# Patient Record
Sex: FEMALE | Race: WHITE | Employment: UNEMPLOYED | URBAN - NONMETROPOLITAN AREA
[De-identification: names, ages, dates, MRNs, and addresses within clinical notes are randomized per-mention and may not be internally consistent; named-entity substitution may affect disease eponyms.]

---

## 2018-12-17 ENCOUNTER — HOSPITAL ENCOUNTER (EMERGENCY)
Age: 23
Discharge: HOME | End: 2018-12-17
Attending: EMERGENCY MEDICINE
Payer: COMMERCIAL

## 2018-12-17 ENCOUNTER — APPOINTMENT (OUTPATIENT)
Dept: ULTRASOUND IMAGING | Age: 23
End: 2018-12-17
Payer: COMMERCIAL

## 2018-12-17 VITALS
TEMPERATURE: 98.3 F | SYSTOLIC BLOOD PRESSURE: 112 MMHG | RESPIRATION RATE: 16 BRPM | DIASTOLIC BLOOD PRESSURE: 67 MMHG | OXYGEN SATURATION: 100 % | HEART RATE: 106 BPM

## 2018-12-17 DIAGNOSIS — O21.0 HYPEREMESIS GRAVIDARUM: Primary | ICD-10-CM

## 2018-12-17 DIAGNOSIS — Z3A.01 7 WEEKS GESTATION OF PREGNANCY: ICD-10-CM

## 2018-12-17 LAB
ABO: NORMAL
ALBUMIN SERPL-MCNC: 4.2 G/DL (ref 3.5–5.1)
ALP BLD-CCNC: 65 U/L (ref 38–126)
ALT SERPL-CCNC: 12 U/L (ref 11–66)
ANION GAP SERPL CALCULATED.3IONS-SCNC: 15 MEQ/L (ref 8–16)
AST SERPL-CCNC: 15 U/L (ref 5–40)
BASOPHILS # BLD: 0.2 %
BASOPHILS ABSOLUTE: 0 THOU/MM3 (ref 0–0.1)
BILIRUB SERPL-MCNC: 0.6 MG/DL (ref 0.3–1.2)
BILIRUBIN DIRECT: < 0.2 MG/DL (ref 0–0.3)
BUN BLDV-MCNC: 17 MG/DL (ref 7–22)
CALCIUM SERPL-MCNC: 9.7 MG/DL (ref 8.5–10.5)
CHLORIDE BLD-SCNC: 100 MEQ/L (ref 98–111)
CO2: 23 MEQ/L (ref 23–33)
CREAT SERPL-MCNC: 0.7 MG/DL (ref 0.4–1.2)
EOSINOPHIL # BLD: 0.6 %
EOSINOPHILS ABSOLUTE: 0.1 THOU/MM3 (ref 0–0.4)
ERYTHROCYTE [DISTWIDTH] IN BLOOD BY AUTOMATED COUNT: 11.5 % (ref 11.5–14.5)
ERYTHROCYTE [DISTWIDTH] IN BLOOD BY AUTOMATED COUNT: 37.4 FL (ref 35–45)
GFR SERPL CREATININE-BSD FRML MDRD: > 90 ML/MIN/1.73M2
GLUCOSE BLD-MCNC: 84 MG/DL (ref 70–108)
HCG,BETA SUBUNIT,QUAL,SERUM: ABNORMAL MIU/ML (ref 0–5)
HCT VFR BLD CALC: 41.4 % (ref 37–47)
HEMOGLOBIN: 14.4 GM/DL (ref 12–16)
IMMATURE GRANS (ABS): 0.03 THOU/MM3 (ref 0–0.07)
IMMATURE GRANULOCYTES: 0.4 %
LIPASE: 22.3 U/L (ref 5.6–51.3)
LYMPHOCYTES # BLD: 21.1 %
LYMPHOCYTES ABSOLUTE: 1.8 THOU/MM3 (ref 1–4.8)
MCH RBC QN AUTO: 31.4 PG (ref 26–33)
MCHC RBC AUTO-ENTMCNC: 34.8 GM/DL (ref 32.2–35.5)
MCV RBC AUTO: 90.2 FL (ref 81–99)
MONOCYTES # BLD: 8.2 %
MONOCYTES ABSOLUTE: 0.7 THOU/MM3 (ref 0.4–1.3)
NUCLEATED RED BLOOD CELLS: 0 /100 WBC
OSMOLALITY CALCULATION: 276.4 MOSMOL/KG (ref 275–300)
PLATELET # BLD: 270 THOU/MM3 (ref 130–400)
PMV BLD AUTO: 9.8 FL (ref 9.4–12.4)
POTASSIUM SERPL-SCNC: 3.9 MEQ/L (ref 3.5–5.2)
RBC # BLD: 4.59 MILL/MM3 (ref 4.2–5.4)
RH FACTOR: NORMAL
SEG NEUTROPHILS: 69.5 %
SEGMENTED NEUTROPHILS ABSOLUTE COUNT: 5.9 THOU/MM3 (ref 1.8–7.7)
SODIUM BLD-SCNC: 138 MEQ/L (ref 135–145)
TOTAL PROTEIN: 7.7 G/DL (ref 6.1–8)
WBC # BLD: 8.5 THOU/MM3 (ref 4.8–10.8)

## 2018-12-17 PROCEDURE — 76801 OB US < 14 WKS SINGLE FETUS: CPT

## 2018-12-17 PROCEDURE — 76817 TRANSVAGINAL US OBSTETRIC: CPT

## 2018-12-17 PROCEDURE — 86901 BLOOD TYPING SEROLOGIC RH(D): CPT

## 2018-12-17 PROCEDURE — 85025 COMPLETE CBC W/AUTO DIFF WBC: CPT

## 2018-12-17 PROCEDURE — 84702 CHORIONIC GONADOTROPIN TEST: CPT

## 2018-12-17 PROCEDURE — 80053 COMPREHEN METABOLIC PANEL: CPT

## 2018-12-17 PROCEDURE — 82248 BILIRUBIN DIRECT: CPT

## 2018-12-17 PROCEDURE — 6370000000 HC RX 637 (ALT 250 FOR IP)

## 2018-12-17 PROCEDURE — 83690 ASSAY OF LIPASE: CPT

## 2018-12-17 PROCEDURE — 99284 EMERGENCY DEPT VISIT MOD MDM: CPT

## 2018-12-17 PROCEDURE — 86900 BLOOD TYPING SEROLOGIC ABO: CPT

## 2018-12-17 PROCEDURE — 36415 COLL VENOUS BLD VENIPUNCTURE: CPT

## 2018-12-17 RX ORDER — ACETAMINOPHEN 325 MG/1
TABLET ORAL
Status: COMPLETED
Start: 2018-12-17 | End: 2018-12-17

## 2018-12-17 RX ORDER — METOCLOPRAMIDE 10 MG/1
TABLET ORAL
Qty: 10 TABLET | Refills: 0 | Status: SHIPPED | OUTPATIENT
Start: 2018-12-17 | End: 2018-12-30

## 2018-12-17 RX ORDER — 0.9 % SODIUM CHLORIDE 0.9 %
500 INTRAVENOUS SOLUTION INTRAVENOUS ONCE
Status: DISCONTINUED | OUTPATIENT
Start: 2018-12-17 | End: 2018-12-17 | Stop reason: HOSPADM

## 2018-12-17 RX ORDER — ACETAMINOPHEN 325 MG/1
650 TABLET ORAL ONCE
Status: COMPLETED | OUTPATIENT
Start: 2018-12-17 | End: 2018-12-17

## 2018-12-17 RX ORDER — SODIUM CHLORIDE 9 MG/ML
INJECTION, SOLUTION INTRAVENOUS CONTINUOUS
Status: DISCONTINUED | OUTPATIENT
Start: 2018-12-17 | End: 2018-12-17 | Stop reason: HOSPADM

## 2018-12-17 RX ADMIN — ACETAMINOPHEN 650 MG: 325 TABLET ORAL at 15:16

## 2018-12-17 ASSESSMENT — ENCOUNTER SYMPTOMS
VOMITING: 1
PHOTOPHOBIA: 0
BLOOD IN STOOL: 0
RHINORRHEA: 0
BACK PAIN: 0
DIARRHEA: 0
NAUSEA: 1
SHORTNESS OF BREATH: 0
SORE THROAT: 0
ABDOMINAL PAIN: 1
COUGH: 0
CHEST TIGHTNESS: 0
CONSTIPATION: 0
WHEEZING: 0

## 2018-12-17 ASSESSMENT — PAIN DESCRIPTION - PAIN TYPE: TYPE: ACUTE PAIN

## 2018-12-17 ASSESSMENT — PAIN SCALES - GENERAL: PAINLEVEL_OUTOF10: 4

## 2018-12-17 ASSESSMENT — PAIN DESCRIPTION - DESCRIPTORS: DESCRIPTORS: CRAMPING

## 2018-12-17 NOTE — ED TRIAGE NOTES
Patient presents with morning sickness. States she took her pregnancy test last week and it was positive. States she started feeling nauseated and sick on Tuesday and her prenatals made her more sick.  States the last time she threw up was last night

## 2018-12-17 NOTE — ED PROVIDER NOTES
guarding. Musculoskeletal: Normal range of motion. Neurological: She is alert and oriented to person, place, and time. No sensory deficit. She exhibits normal muscle tone. Skin: Skin is warm and dry. No rash noted. Psychiatric: She has a normal mood and affect. Her speech is normal and behavior is normal.   Nursing note and vitals reviewed. MEDICAL DECISION MAKING    DIFFERENTIAL DIAGNOSIS:  Hyperemesis gravidarum, dehydration      DIAGNOSTIC RESULTS    RADIOLOGY:  I have reviewed radiologic plain film image(s). The plain films will be read or overread by the radiologist.All other non-plain film images(s) such as CT, Ultrasound and MRI have been read by the radiologist.  US OB LESS THAN 14 WEEKS SINGLE OR FIRST GESTATION   Final Result      1. Single, living, intrauterine gestation with estimated gestational age by today's ultrasound of 7 weeks, 3 days. Estimated due date of 8/2/2019.   2. Small perigestational subchorionic hemorrhage. **This report has been created using voice recognition software. It may contain minor errors which are inherent in voice recognition technology. **      Final report electronically signed by Dr. Guillermo Rincon on 12/17/2018 3:09 PM      US OB TRANSVAGINAL   Final Result      1. Single, living, intrauterine gestation with estimated gestational age by today's ultrasound of 7 weeks, 3 days. Estimated due date of 8/2/2019.   2. Small perigestational subchorionic hemorrhage. **This report has been created using voice recognition software. It may contain minor errors which are inherent in voice recognition technology. **      Final report electronically signed by Dr. Guillermo Rincon on 12/17/2018 3:09 PM          LABS:   Labs Reviewed   HCG, QUANTITATIVE, PREGNANCY - Abnormal; Notable for the following:        Result Value    hCG,Beta Subunit,Qual,Serum 217401.0 (*)     All other components within normal limits   CBC WITH AUTO DIFFERENTIAL   BASIC METABOLIC PANEL González MEYER Signed by: Janis Trejo, 12/23/18 11:10 PM    Provider:  I personally performed the services described in the documentation, reviewed and edited the documentation which was dictated to the scribe in my presence, and it accurately records my words andactions.      12/23/18 11:10 PM      No att. providers found      Emergency room physician       Ivania Muñiz MD  12/23/18 [id number]

## 2018-12-30 ENCOUNTER — HOSPITAL ENCOUNTER (EMERGENCY)
Age: 23
Discharge: HOME | End: 2018-12-30
Attending: EMERGENCY MEDICINE
Payer: COMMERCIAL

## 2018-12-30 ENCOUNTER — NURSE TRIAGE (OUTPATIENT)
Dept: ADMINISTRATIVE | Age: 23
End: 2018-12-30

## 2018-12-30 VITALS
HEIGHT: 64 IN | TEMPERATURE: 98.7 F | WEIGHT: 160 LBS | DIASTOLIC BLOOD PRESSURE: 68 MMHG | RESPIRATION RATE: 15 BRPM | HEART RATE: 87 BPM | SYSTOLIC BLOOD PRESSURE: 120 MMHG | OXYGEN SATURATION: 97 % | BODY MASS INDEX: 27.31 KG/M2

## 2018-12-30 DIAGNOSIS — R51.9 NONINTRACTABLE EPISODIC HEADACHE, UNSPECIFIED HEADACHE TYPE: ICD-10-CM

## 2018-12-30 DIAGNOSIS — O21.0 HYPEREMESIS GRAVIDARUM: ICD-10-CM

## 2018-12-30 DIAGNOSIS — Z3A.09 9 WEEKS GESTATION OF PREGNANCY: Primary | ICD-10-CM

## 2018-12-30 LAB
ANION GAP SERPL CALCULATED.3IONS-SCNC: 13 MEQ/L (ref 8–16)
BASOPHILS # BLD: 0.1 %
BASOPHILS ABSOLUTE: 0 THOU/MM3 (ref 0–0.1)
BUN BLDV-MCNC: 14 MG/DL (ref 7–22)
CALCIUM SERPL-MCNC: 9.2 MG/DL (ref 8.5–10.5)
CHLORIDE BLD-SCNC: 97 MEQ/L (ref 98–111)
CO2: 25 MEQ/L (ref 23–33)
CREAT SERPL-MCNC: 0.4 MG/DL (ref 0.4–1.2)
EOSINOPHIL # BLD: 0.8 %
EOSINOPHILS ABSOLUTE: 0.1 THOU/MM3 (ref 0–0.4)
ERYTHROCYTE [DISTWIDTH] IN BLOOD BY AUTOMATED COUNT: 11.4 % (ref 11.5–14.5)
ERYTHROCYTE [DISTWIDTH] IN BLOOD BY AUTOMATED COUNT: 36.8 FL (ref 35–45)
GFR SERPL CREATININE-BSD FRML MDRD: > 90 ML/MIN/1.73M2
GLUCOSE BLD-MCNC: 90 MG/DL (ref 70–108)
HCT VFR BLD CALC: 38 % (ref 37–47)
HEMOGLOBIN: 13.5 GM/DL (ref 12–16)
IMMATURE GRANS (ABS): 0.03 THOU/MM3 (ref 0–0.07)
IMMATURE GRANULOCYTES: 0.3 %
LYMPHOCYTES # BLD: 22 %
LYMPHOCYTES ABSOLUTE: 2 THOU/MM3 (ref 1–4.8)
MCH RBC QN AUTO: 31.3 PG (ref 26–33)
MCHC RBC AUTO-ENTMCNC: 35.5 GM/DL (ref 32.2–35.5)
MCV RBC AUTO: 88 FL (ref 81–99)
MONOCYTES # BLD: 8.3 %
MONOCYTES ABSOLUTE: 0.7 THOU/MM3 (ref 0.4–1.3)
NUCLEATED RED BLOOD CELLS: 0 /100 WBC
OSMOLALITY CALCULATION: 270.1 MOSMOL/KG (ref 275–300)
PLATELET # BLD: 267 THOU/MM3 (ref 130–400)
PMV BLD AUTO: 9.6 FL (ref 9.4–12.4)
POTASSIUM SERPL-SCNC: 3.6 MEQ/L (ref 3.5–5.2)
PREGNANCY, SERUM: POSITIVE
RBC # BLD: 4.32 MILL/MM3 (ref 4.2–5.4)
SEG NEUTROPHILS: 68.5 %
SEGMENTED NEUTROPHILS ABSOLUTE COUNT: 6.2 THOU/MM3 (ref 1.8–7.7)
SODIUM BLD-SCNC: 135 MEQ/L (ref 135–145)
WBC # BLD: 9 THOU/MM3 (ref 4.8–10.8)

## 2018-12-30 PROCEDURE — 84703 CHORIONIC GONADOTROPIN ASSAY: CPT

## 2018-12-30 PROCEDURE — 80048 BASIC METABOLIC PNL TOTAL CA: CPT

## 2018-12-30 PROCEDURE — 99284 EMERGENCY DEPT VISIT MOD MDM: CPT

## 2018-12-30 PROCEDURE — 6360000002 HC RX W HCPCS: Performed by: EMERGENCY MEDICINE

## 2018-12-30 PROCEDURE — 85025 COMPLETE CBC W/AUTO DIFF WBC: CPT

## 2018-12-30 PROCEDURE — 6360000002 HC RX W HCPCS

## 2018-12-30 PROCEDURE — 2580000003 HC RX 258: Performed by: EMERGENCY MEDICINE

## 2018-12-30 PROCEDURE — 96361 HYDRATE IV INFUSION ADD-ON: CPT

## 2018-12-30 PROCEDURE — 96374 THER/PROPH/DIAG INJ IV PUSH: CPT

## 2018-12-30 PROCEDURE — 36415 COLL VENOUS BLD VENIPUNCTURE: CPT

## 2018-12-30 RX ORDER — ONDANSETRON 4 MG/1
4 TABLET, ORALLY DISINTEGRATING ORAL ONCE
Status: COMPLETED | OUTPATIENT
Start: 2018-12-30 | End: 2018-12-30

## 2018-12-30 RX ORDER — METOCLOPRAMIDE HYDROCHLORIDE 5 MG/ML
10 INJECTION INTRAMUSCULAR; INTRAVENOUS ONCE
Status: COMPLETED | OUTPATIENT
Start: 2018-12-30 | End: 2018-12-30

## 2018-12-30 RX ORDER — KETOROLAC TROMETHAMINE 30 MG/ML
30 INJECTION, SOLUTION INTRAMUSCULAR; INTRAVENOUS ONCE
Status: DISCONTINUED | OUTPATIENT
Start: 2018-12-30 | End: 2018-12-30

## 2018-12-30 RX ORDER — 0.9 % SODIUM CHLORIDE 0.9 %
500 INTRAVENOUS SOLUTION INTRAVENOUS ONCE
Status: COMPLETED | OUTPATIENT
Start: 2018-12-30 | End: 2018-12-30

## 2018-12-30 RX ORDER — ONDANSETRON 4 MG/1
TABLET, ORALLY DISINTEGRATING ORAL
Status: COMPLETED
Start: 2018-12-30 | End: 2018-12-30

## 2018-12-30 RX ORDER — SODIUM CHLORIDE 9 MG/ML
INJECTION, SOLUTION INTRAVENOUS CONTINUOUS
Status: DISCONTINUED | OUTPATIENT
Start: 2018-12-30 | End: 2018-12-31 | Stop reason: HOSPADM

## 2018-12-30 RX ORDER — ONDANSETRON 4 MG/1
4 TABLET, FILM COATED ORAL EVERY 8 HOURS PRN
COMMUNITY
End: 2021-09-08 | Stop reason: ALTCHOICE

## 2018-12-30 RX ORDER — PROMETHAZINE HYDROCHLORIDE 25 MG/1
25 TABLET ORAL EVERY 6 HOURS PRN
Qty: 20 TABLET | Refills: 0 | Status: SHIPPED | OUTPATIENT
Start: 2018-12-30 | End: 2019-12-30

## 2018-12-30 RX ADMIN — ONDANSETRON 4 MG: 4 TABLET, ORALLY DISINTEGRATING ORAL at 23:05

## 2018-12-30 RX ADMIN — METOCLOPRAMIDE 10 MG: 5 INJECTION, SOLUTION INTRAMUSCULAR; INTRAVENOUS at 22:11

## 2018-12-30 RX ADMIN — SODIUM CHLORIDE 500 ML: 9 INJECTION, SOLUTION INTRAVENOUS at 22:11

## 2018-12-30 ASSESSMENT — ENCOUNTER SYMPTOMS
WHEEZING: 0
EYE PAIN: 0
SORE THROAT: 0
BACK PAIN: 0
RHINORRHEA: 0
DIARRHEA: 0
NAUSEA: 1
ABDOMINAL PAIN: 0
SHORTNESS OF BREATH: 0
VOMITING: 1
EYE DISCHARGE: 0
COUGH: 0

## 2018-12-30 ASSESSMENT — PAIN DESCRIPTION - DESCRIPTORS: DESCRIPTORS: ACHING

## 2018-12-30 ASSESSMENT — PAIN SCALES - GENERAL: PAINLEVEL_OUTOF10: 4

## 2018-12-30 ASSESSMENT — PAIN DESCRIPTION - PAIN TYPE: TYPE: ACUTE PAIN

## 2018-12-30 ASSESSMENT — PAIN DESCRIPTION - LOCATION: LOCATION: HEAD

## 2019-08-09 ENCOUNTER — ANESTHESIA EVENT (OUTPATIENT)
Dept: LABOR AND DELIVERY | Age: 24
DRG: 560 | End: 2019-08-09
Payer: MEDICAID

## 2019-08-09 ENCOUNTER — HOSPITAL ENCOUNTER (INPATIENT)
Age: 24
LOS: 2 days | Discharge: HOME | DRG: 560 | End: 2019-08-11
Attending: OBSTETRICS & GYNECOLOGY | Admitting: OBSTETRICS & GYNECOLOGY
Payer: MEDICAID

## 2019-08-09 ENCOUNTER — ANESTHESIA (OUTPATIENT)
Dept: LABOR AND DELIVERY | Age: 24
DRG: 560 | End: 2019-08-09
Payer: MEDICAID

## 2019-08-09 PROBLEM — O47.9 FALSE LABOR: Status: ACTIVE | Noted: 2019-08-09

## 2019-08-09 LAB
ABO: NORMAL
AMPHETAMINE+METHAMPHETAMINE URINE SCREEN: NEGATIVE
ANTIBODY SCREEN: NORMAL
BARBITURATE QUANTITATIVE URINE: NEGATIVE
BENZODIAZEPINE QUANTITATIVE URINE: NEGATIVE
CANNABINOID QUANTITATIVE URINE: NEGATIVE
COCAINE METABOLITE QUANTITATIVE URINE: NEGATIVE
ERYTHROCYTE [DISTWIDTH] IN BLOOD BY AUTOMATED COUNT: 14 % (ref 11.5–14.5)
ERYTHROCYTE [DISTWIDTH] IN BLOOD BY AUTOMATED COUNT: 46.9 FL (ref 35–45)
HCT VFR BLD CALC: 36.4 % (ref 37–47)
HEMOGLOBIN: 12 GM/DL (ref 12–16)
MCH RBC QN AUTO: 30.1 PG (ref 26–33)
MCHC RBC AUTO-ENTMCNC: 33 GM/DL (ref 32.2–35.5)
MCV RBC AUTO: 91.2 FL (ref 81–99)
OPIATES, URINE: NEGATIVE
OXYCODONE: NEGATIVE
PHENCYCLIDINE QUANTITATIVE URINE: NEGATIVE
PLATELET # BLD: 229 THOU/MM3 (ref 130–400)
PMV BLD AUTO: 10.1 FL (ref 9.4–12.4)
RBC # BLD: 3.99 MILL/MM3 (ref 4.2–5.4)
RH FACTOR: NORMAL
RPR: NONREACTIVE
WBC # BLD: 12.9 THOU/MM3 (ref 4.8–10.8)

## 2019-08-09 PROCEDURE — 86850 RBC ANTIBODY SCREEN: CPT

## 2019-08-09 PROCEDURE — 80305 DRUG TEST PRSMV DIR OPT OBS: CPT

## 2019-08-09 PROCEDURE — 85027 COMPLETE CBC AUTOMATED: CPT

## 2019-08-09 PROCEDURE — 36415 COLL VENOUS BLD VENIPUNCTURE: CPT

## 2019-08-09 PROCEDURE — 86901 BLOOD TYPING SEROLOGIC RH(D): CPT

## 2019-08-09 PROCEDURE — 1200000000 HC SEMI PRIVATE

## 2019-08-09 PROCEDURE — 3700000025 EPIDURAL BLOCK: Performed by: ANESTHESIOLOGY

## 2019-08-09 PROCEDURE — 2580000003 HC RX 258: Performed by: OBSTETRICS & GYNECOLOGY

## 2019-08-09 PROCEDURE — 86592 SYPHILIS TEST NON-TREP QUAL: CPT

## 2019-08-09 PROCEDURE — 6370000000 HC RX 637 (ALT 250 FOR IP): Performed by: OBSTETRICS & GYNECOLOGY

## 2019-08-09 PROCEDURE — 0KQM0ZZ REPAIR PERINEUM MUSCLE, OPEN APPROACH: ICD-10-PCS | Performed by: OBSTETRICS & GYNECOLOGY

## 2019-08-09 PROCEDURE — 2709999900 HC NON-CHARGEABLE SUPPLY

## 2019-08-09 PROCEDURE — 10907ZC DRAINAGE OF AMNIOTIC FLUID, THERAPEUTIC FROM PRODUCTS OF CONCEPTION, VIA NATURAL OR ARTIFICIAL OPENING: ICD-10-PCS | Performed by: OBSTETRICS & GYNECOLOGY

## 2019-08-09 PROCEDURE — 7200000001 HC VAGINAL DELIVERY

## 2019-08-09 PROCEDURE — 2500000003 HC RX 250 WO HCPCS: Performed by: ANESTHESIOLOGY

## 2019-08-09 PROCEDURE — 86900 BLOOD TYPING SEROLOGIC ABO: CPT

## 2019-08-09 PROCEDURE — 6360000002 HC RX W HCPCS

## 2019-08-09 PROCEDURE — 6360000002 HC RX W HCPCS: Performed by: OBSTETRICS & GYNECOLOGY

## 2019-08-09 RX ORDER — METHYLERGONOVINE MALEATE 0.2 MG/ML
200 INJECTION INTRAVENOUS SEE ADMIN INSTRUCTIONS
Status: DISCONTINUED | OUTPATIENT
Start: 2019-08-09 | End: 2019-08-11 | Stop reason: HOSPADM

## 2019-08-09 RX ORDER — SODIUM CHLORIDE 0.9 % (FLUSH) 0.9 %
10 SYRINGE (ML) INJECTION EVERY 12 HOURS SCHEDULED
Status: DISCONTINUED | OUTPATIENT
Start: 2019-08-09 | End: 2019-08-11 | Stop reason: HOSPADM

## 2019-08-09 RX ORDER — FERROUS SULFATE 325(65) MG
325 TABLET ORAL
Status: DISCONTINUED | OUTPATIENT
Start: 2019-08-10 | End: 2019-08-11 | Stop reason: HOSPADM

## 2019-08-09 RX ORDER — IBUPROFEN 800 MG/1
800 TABLET ORAL EVERY 8 HOURS PRN
Status: DISCONTINUED | OUTPATIENT
Start: 2019-08-09 | End: 2019-08-09 | Stop reason: HOSPADM

## 2019-08-09 RX ORDER — ACETAMINOPHEN 325 MG/1
650 TABLET ORAL EVERY 4 HOURS PRN
Status: DISCONTINUED | OUTPATIENT
Start: 2019-08-09 | End: 2019-08-11 | Stop reason: HOSPADM

## 2019-08-09 RX ORDER — SODIUM CHLORIDE 0.9 % (FLUSH) 0.9 %
10 SYRINGE (ML) INJECTION PRN
Status: DISCONTINUED | OUTPATIENT
Start: 2019-08-09 | End: 2019-08-11 | Stop reason: HOSPADM

## 2019-08-09 RX ORDER — MISOPROSTOL 200 UG/1
1000 TABLET ORAL PRN
Status: DISCONTINUED | OUTPATIENT
Start: 2019-08-09 | End: 2019-08-11 | Stop reason: HOSPADM

## 2019-08-09 RX ORDER — LANOLIN 100 %
OINTMENT (GRAM) TOPICAL PRN
Status: DISCONTINUED | OUTPATIENT
Start: 2019-08-09 | End: 2019-08-11 | Stop reason: HOSPADM

## 2019-08-09 RX ORDER — LIDOCAINE HYDROCHLORIDE 10 MG/ML
30 INJECTION, SOLUTION EPIDURAL; INFILTRATION; INTRACAUDAL; PERINEURAL PRN
Status: DISCONTINUED | OUTPATIENT
Start: 2019-08-09 | End: 2019-08-09 | Stop reason: HOSPADM

## 2019-08-09 RX ORDER — ONDANSETRON 4 MG/1
8 TABLET, FILM COATED ORAL EVERY 8 HOURS PRN
Status: DISCONTINUED | OUTPATIENT
Start: 2019-08-09 | End: 2019-08-11 | Stop reason: HOSPADM

## 2019-08-09 RX ORDER — METHYLERGONOVINE MALEATE 0.2 MG/ML
200 INJECTION INTRAVENOUS PRN
Status: DISCONTINUED | OUTPATIENT
Start: 2019-08-09 | End: 2019-08-09 | Stop reason: HOSPADM

## 2019-08-09 RX ORDER — SODIUM PHOSPHATE, DIBASIC AND SODIUM PHOSPHATE, MONOBASIC 7; 19 G/133ML; G/133ML
1 ENEMA RECTAL
Status: DISCONTINUED | OUTPATIENT
Start: 2019-08-09 | End: 2019-08-09

## 2019-08-09 RX ORDER — DOCUSATE SODIUM 100 MG/1
100 CAPSULE, LIQUID FILLED ORAL 2 TIMES DAILY PRN
Status: DISCONTINUED | OUTPATIENT
Start: 2019-08-09 | End: 2019-08-11 | Stop reason: HOSPADM

## 2019-08-09 RX ORDER — CARBOPROST TROMETHAMINE 250 UG/ML
250 INJECTION, SOLUTION INTRAMUSCULAR PRN
Status: DISCONTINUED | OUTPATIENT
Start: 2019-08-09 | End: 2019-08-09 | Stop reason: HOSPADM

## 2019-08-09 RX ORDER — CARBOPROST TROMETHAMINE 250 UG/ML
250 INJECTION, SOLUTION INTRAMUSCULAR
Status: DISPENSED | OUTPATIENT
Start: 2019-08-09 | End: 2019-08-09

## 2019-08-09 RX ORDER — BUTORPHANOL TARTRATE 1 MG/ML
1 INJECTION, SOLUTION INTRAMUSCULAR; INTRAVENOUS
Status: DISCONTINUED | OUTPATIENT
Start: 2019-08-09 | End: 2019-08-09 | Stop reason: HOSPADM

## 2019-08-09 RX ORDER — MISOPROSTOL 200 UG/1
1000 TABLET ORAL PRN
Status: DISCONTINUED | OUTPATIENT
Start: 2019-08-09 | End: 2019-08-09 | Stop reason: HOSPADM

## 2019-08-09 RX ORDER — SODIUM CHLORIDE 0.9 % (FLUSH) 0.9 %
10 SYRINGE (ML) INJECTION PRN
Status: DISCONTINUED | OUTPATIENT
Start: 2019-08-09 | End: 2019-08-09 | Stop reason: HOSPADM

## 2019-08-09 RX ORDER — NALBUPHINE HCL 10 MG/ML
5 AMPUL (ML) INJECTION EVERY 4 HOURS PRN
Status: DISCONTINUED | OUTPATIENT
Start: 2019-08-09 | End: 2019-08-09 | Stop reason: HOSPADM

## 2019-08-09 RX ORDER — ONDANSETRON 2 MG/ML
8 INJECTION INTRAMUSCULAR; INTRAVENOUS EVERY 6 HOURS PRN
Status: DISCONTINUED | OUTPATIENT
Start: 2019-08-09 | End: 2019-08-09 | Stop reason: HOSPADM

## 2019-08-09 RX ORDER — DIPHENHYDRAMINE HYDROCHLORIDE 50 MG/ML
25 INJECTION INTRAMUSCULAR; INTRAVENOUS EVERY 4 HOURS PRN
Status: DISCONTINUED | OUTPATIENT
Start: 2019-08-09 | End: 2019-08-09 | Stop reason: HOSPADM

## 2019-08-09 RX ORDER — SEVOFLURANE 250 ML/250ML
1 LIQUID RESPIRATORY (INHALATION) CONTINUOUS PRN
Status: DISCONTINUED | OUTPATIENT
Start: 2019-08-09 | End: 2019-08-09 | Stop reason: HOSPADM

## 2019-08-09 RX ORDER — NALOXONE HYDROCHLORIDE 0.4 MG/ML
0.4 INJECTION, SOLUTION INTRAMUSCULAR; INTRAVENOUS; SUBCUTANEOUS PRN
Status: DISCONTINUED | OUTPATIENT
Start: 2019-08-09 | End: 2019-08-09 | Stop reason: HOSPADM

## 2019-08-09 RX ORDER — HYDROCODONE BITARTRATE AND ACETAMINOPHEN 5; 325 MG/1; MG/1
1 TABLET ORAL EVERY 4 HOURS PRN
Status: DISCONTINUED | OUTPATIENT
Start: 2019-08-09 | End: 2019-08-11 | Stop reason: HOSPADM

## 2019-08-09 RX ORDER — TERBUTALINE SULFATE 1 MG/ML
0.25 INJECTION, SOLUTION SUBCUTANEOUS ONCE
Status: DISCONTINUED | OUTPATIENT
Start: 2019-08-09 | End: 2019-08-09 | Stop reason: HOSPADM

## 2019-08-09 RX ORDER — ACETAMINOPHEN 325 MG/1
650 TABLET ORAL EVERY 4 HOURS PRN
Status: DISCONTINUED | OUTPATIENT
Start: 2019-08-09 | End: 2019-08-09 | Stop reason: HOSPADM

## 2019-08-09 RX ORDER — SODIUM CHLORIDE 0.9 % (FLUSH) 0.9 %
10 SYRINGE (ML) INJECTION EVERY 12 HOURS SCHEDULED
Status: DISCONTINUED | OUTPATIENT
Start: 2019-08-09 | End: 2019-08-09

## 2019-08-09 RX ORDER — SODIUM CHLORIDE, SODIUM LACTATE, POTASSIUM CHLORIDE, CALCIUM CHLORIDE 600; 310; 30; 20 MG/100ML; MG/100ML; MG/100ML; MG/100ML
INJECTION, SOLUTION INTRAVENOUS CONTINUOUS
Status: DISCONTINUED | OUTPATIENT
Start: 2019-08-09 | End: 2019-08-11 | Stop reason: HOSPADM

## 2019-08-09 RX ORDER — SODIUM CHLORIDE, SODIUM LACTATE, POTASSIUM CHLORIDE, CALCIUM CHLORIDE 600; 310; 30; 20 MG/100ML; MG/100ML; MG/100ML; MG/100ML
INJECTION, SOLUTION INTRAVENOUS CONTINUOUS
Status: DISCONTINUED | OUTPATIENT
Start: 2019-08-09 | End: 2019-08-09

## 2019-08-09 RX ORDER — ONDANSETRON 2 MG/ML
4 INJECTION INTRAMUSCULAR; INTRAVENOUS EVERY 6 HOURS PRN
Status: DISCONTINUED | OUTPATIENT
Start: 2019-08-09 | End: 2019-08-09 | Stop reason: HOSPADM

## 2019-08-09 RX ORDER — IBUPROFEN 800 MG/1
800 TABLET ORAL EVERY 8 HOURS SCHEDULED
Status: DISCONTINUED | OUTPATIENT
Start: 2019-08-09 | End: 2019-08-11 | Stop reason: HOSPADM

## 2019-08-09 RX ADMIN — SODIUM CHLORIDE, POTASSIUM CHLORIDE, SODIUM LACTATE AND CALCIUM CHLORIDE: 600; 310; 30; 20 INJECTION, SOLUTION INTRAVENOUS at 11:08

## 2019-08-09 RX ADMIN — SODIUM CHLORIDE, POTASSIUM CHLORIDE, SODIUM LACTATE AND CALCIUM CHLORIDE: 600; 310; 30; 20 INJECTION, SOLUTION INTRAVENOUS at 14:35

## 2019-08-09 RX ADMIN — BUTORPHANOL TARTRATE 1 MG: 1 INJECTION, SOLUTION INTRAMUSCULAR; INTRAVENOUS at 09:45

## 2019-08-09 RX ADMIN — ONDANSETRON 8 MG: 2 INJECTION INTRAMUSCULAR; INTRAVENOUS at 14:33

## 2019-08-09 RX ADMIN — SODIUM CHLORIDE, POTASSIUM CHLORIDE, SODIUM LACTATE AND CALCIUM CHLORIDE: 600; 310; 30; 20 INJECTION, SOLUTION INTRAVENOUS at 07:55

## 2019-08-09 RX ADMIN — Medication 1 MILLI-UNITS/MIN: at 08:50

## 2019-08-09 RX ADMIN — ACETAMINOPHEN 650 MG: 325 TABLET ORAL at 14:35

## 2019-08-09 RX ADMIN — Medication 15 ML/HR: at 11:19

## 2019-08-09 RX ADMIN — IBUPROFEN 800 MG: 800 TABLET, FILM COATED ORAL at 19:56

## 2019-08-09 ASSESSMENT — PAIN SCALES - GENERAL
PAINLEVEL_OUTOF10: 2
PAINLEVEL_OUTOF10: 3
PAINLEVEL_OUTOF10: 6
PAINLEVEL_OUTOF10: 0

## 2019-08-09 NOTE — ANESTHESIA PRE PROCEDURE
Department of Anesthesiology  Preprocedure Note       Name:  Rhonda Cintron   Age:  26 y.o.  :  1995                                          MRN:  [id number]         Date:  2019      Surgeon: * No surgeons listed *    Procedure: Labor Analgesia    Medications prior to admission:   Prior to Admission medications    Medication Sig Start Date End Date Taking?  Authorizing Provider   ondansetron (ZOFRAN) 4 MG tablet Take 4 mg by mouth every 8 hours as needed for Nausea or Vomiting    Historical Provider, MD   promethazine (PHENERGAN) 25 MG tablet Take 1 tablet by mouth every 6 hours as needed for Nausea (vomiting) 18  Noa Dos Santos MD       Current medications:    Current Facility-Administered Medications   Medication Dose Route Frequency Provider Last Rate Last Dose    lactated ringers infusion   Intravenous Continuous Sakshi Castellanos  mL/hr at 19 1108      sodium chloride flush 0.9 % injection 10 mL  10 mL Intravenous 2 times per day Sakshi Castellanos MD        sodium chloride flush 0.9 % injection 10 mL  10 mL Intravenous PRN Sakshi Castellanos MD        lidocaine PF 1 % injection 30 mL  30 mL Other PRN Sakshi Castellanos MD        butorphanol (STADOL) injection 1 mg  1 mg Intravenous Q2H PRN Sakshi Castellanos MD   1 mg at 19 0945    nitrous oxide 50% inhalation 1 each  1 each Inhalation Continuous PRN Sakshi Castellanos MD        acetaminophen (TYLENOL) tablet 650 mg  650 mg Oral Q4H PRN Sakshi Castellanos MD        ibuprofen (ADVIL;MOTRIN) tablet 800 mg  800 mg Oral Q8H PRN Sakshi Castellanos MD        oxytocin (PITOCIN) 30 units in 500 mL infusion  1 lee-units/min Intravenous Continuous Sakshi Castellanos MD 4 mL/hr at 19 0950 4 lee-units/min at 19 0950    diphenhydrAMINE (BENADRYL) injection 25 mg  25 mg Intravenous Q4H PRN Sakshi Castellanos MD        [facility]ron [facility] injection 8 mg  8 mg Intravenous Q6H PRN Sakshi Castellanos MD       [facility]

## 2019-08-09 NOTE — FLOWSHEET NOTE
Dr. Emmett Feliciano advised  at 40 weeks here with ? Ctx and is to be induced  evening. Informed pt jose f every 5 minutes pt talking through, reassuring FHTs, v/e 2/80%. New orders given.

## 2019-08-10 LAB — HEMOGLOBIN: 9.9 GM/DL (ref 12–16)

## 2019-08-10 PROCEDURE — 2709999900 HC NON-CHARGEABLE SUPPLY

## 2019-08-10 PROCEDURE — 85018 HEMOGLOBIN: CPT

## 2019-08-10 PROCEDURE — 6370000000 HC RX 637 (ALT 250 FOR IP): Performed by: OBSTETRICS & GYNECOLOGY

## 2019-08-10 PROCEDURE — 1220000000 HC SEMI PRIVATE OB R&B

## 2019-08-10 PROCEDURE — 36415 COLL VENOUS BLD VENIPUNCTURE: CPT

## 2019-08-10 RX ADMIN — IBUPROFEN 800 MG: 800 TABLET, FILM COATED ORAL at 14:18

## 2019-08-10 ASSESSMENT — PAIN SCALES - GENERAL
PAINLEVEL_OUTOF10: 0
PAINLEVEL_OUTOF10: 4
PAINLEVEL_OUTOF10: 4

## 2019-08-11 VITALS
SYSTOLIC BLOOD PRESSURE: 117 MMHG | RESPIRATION RATE: 18 BRPM | DIASTOLIC BLOOD PRESSURE: 79 MMHG | HEIGHT: 64 IN | HEART RATE: 109 BPM | TEMPERATURE: 98.7 F | BODY MASS INDEX: 35 KG/M2 | WEIGHT: 205 LBS

## 2019-08-11 PROCEDURE — 6370000000 HC RX 637 (ALT 250 FOR IP): Performed by: OBSTETRICS & GYNECOLOGY

## 2019-08-11 RX ADMIN — DOCUSATE SODIUM 100 MG: 100 CAPSULE, LIQUID FILLED ORAL at 08:55

## 2019-08-11 RX ADMIN — IBUPROFEN 800 MG: 800 TABLET, FILM COATED ORAL at 00:22

## 2019-08-11 RX ADMIN — IBUPROFEN 800 MG: 800 TABLET, FILM COATED ORAL at 08:55

## 2019-08-11 ASSESSMENT — PAIN SCALES - GENERAL
PAINLEVEL_OUTOF10: 5
PAINLEVEL_OUTOF10: 3

## 2019-08-11 NOTE — PLAN OF CARE
Problem: Discharge Planning:  Goal: Discharged to appropriate level of care  Description  Discharged to appropriate level of care  8/10/2019 2040 by Marquita Seip, RN  Outcome: Ongoing  Note:   Patient to be discharged to private residence tomorrow     Problem: Constipation:  Goal: Bowel elimination is within specified parameters  Description  Bowel elimination is within specified parameters  8/10/2019 2040 by Marquita Seip, RN  Outcome: Ongoing  Note:   Taking stool softeners and increasing fiber and fluid in diet. Ambulation encouraged. Problem: Fluid Volume - Imbalance:  Goal: Absence of postpartum hemorrhage signs and symptoms  Description  Absence of postpartum hemorrhage signs and symptoms  8/10/2019 2040 by Marquita Seip, RN  Outcome: Ongoing  Note:   Vaginal bleeding WNL, no clots or foul odors. Problem: Infection - Risk of, Puerperal Infection:  Goal: Will show no infection signs and symptoms  Description  Will show no infection signs and symptoms  8/10/2019 2040 by Marquita Seip, RN  Outcome: Ongoing  Note:   Vital signs and assessments WNL. Problem: Mood - Altered:  Goal: Mood stable  Description  Mood stable  8/10/2019 2040 by Marquita Seip, RN  Outcome: Ongoing  Note:   Bonding with baby, participating in infant care. Problem: Pain - Acute:  Goal: Pain level will decrease  Description  Pain level will decrease  8/10/2019 2040 by Marquita Seip, RN  Outcome: Ongoing  Note:   Pain controlled with po meds. Discussed ice for perineal pain or the use of warm blanket/heating pad for uterine cramps. Pt states her pain goal 4/10 has been met. Care plan reviewed with patient. Patient verbalizes understanding of the plan of care and contribute to goal setting.
Problem: Discharge Planning:  Goal: Discharged to appropriate level of care  Description  Discharged to appropriate level of care  Outcome: Ongoing  Note:   Ducks in a row      Problem: Constipation:  Goal: Bowel elimination is within specified parameters  Description  Bowel elimination is within specified parameters  Outcome: Ongoing  Note:   Passing gas      Problem: Fluid Volume - Imbalance:  Goal: Absence of postpartum hemorrhage signs and symptoms  Description  Absence of postpartum hemorrhage signs and symptoms  Outcome: Ongoing  Note:   Lochia WNL      Problem: Infection - Risk of, Puerperal Infection:  Goal: Will show no infection signs and symptoms  Description  Will show no infection signs and symptoms  Outcome: Ongoing  Note:   No signs of infection      Problem: Mood - Altered:  Goal: Mood stable  Description  Mood stable  Outcome: Ongoing  Note:   Pt pleasant      Problem: Pain - Acute:  Goal: Pain level will decrease  Description  Pain level will decrease  Outcome: Ongoing  Note:   Pain goal met, pain controlled with meds    Plan of care discussed with mother and she contributes to goal setting and voices understanding of plan of care.
of care and contribute to goal setting.
remain free of clutter. Problem: Discharge Planning:  Goal: Discharged to appropriate level of care  Description  Discharged to appropriate level of care  Note:   Pt aware of 2hr recovery period in L&D and then transfer to mom/baby for the remainder of her stay if staying for labor    Care plan reviewed with patient and Connon. Patient and Connon verbalize understanding of the plan of care and contribute to goal setting.

## 2019-08-11 NOTE — DISCHARGE SUMMARY
Obstetric Discharge Summary      Pt Name: Jack Norris  MRN: [id number] [place] #: [de-identified]  YOB: 1995        Admitting Diagnosis  IUP  OB History        1    Para   1    Term   1            AB        Living   1       SAB        TAB        Ectopic        Molar        Multiple   0    Live Births   1                Reasons for Admission on 2019  4:55 AM  False labor [O47.9]  Vaginal delivery [O80]  No comment available  Vaginal Delivery      Intrapartum Procedures                          Postpartum/Operative Complications       [place] Data  Information for the patient's :  Jean Chaparro Girl Camryn Matute [[id number]]   female  Birth Weight: 7 lb 12.3 oz (3.525 kg)      Discharge Diagnosis       Discharge Information  Current Discharge Medication List      CONTINUE these medications which have NOT CHANGED    Details   ondansetron (ZOFRAN) 4 MG tablet Take 4 mg by mouth every 8 hours as needed for Nausea or Vomiting      promethazine (PHENERGAN) 25 MG tablet Take 1 tablet by mouth every 6 hours as needed for Nausea (vomiting)  Qty: 20 tablet, Refills: 0             No discharge procedures on file. Vaginal Delivery  Diet regular  Condition Good    Discharge to:  home  Follow up in 5-6 wks.   Quoc Olvera 2019 4:39 AM

## 2019-08-12 ENCOUNTER — APPOINTMENT (OUTPATIENT)
Dept: CT IMAGING | Age: 24
End: 2019-08-12
Payer: COMMERCIAL

## 2019-08-12 ENCOUNTER — HOSPITAL ENCOUNTER (EMERGENCY)
Age: 24
Discharge: HOME | End: 2019-08-12
Attending: FAMILY MEDICINE
Payer: COMMERCIAL

## 2019-08-12 VITALS
BODY MASS INDEX: 35 KG/M2 | TEMPERATURE: 99 F | DIASTOLIC BLOOD PRESSURE: 80 MMHG | RESPIRATION RATE: 18 BRPM | HEART RATE: 87 BPM | WEIGHT: 205 LBS | OXYGEN SATURATION: 98 % | HEIGHT: 64 IN | SYSTOLIC BLOOD PRESSURE: 117 MMHG

## 2019-08-12 DIAGNOSIS — R07.9 CHEST PAIN, UNSPECIFIED TYPE: Primary | ICD-10-CM

## 2019-08-12 LAB
ANION GAP SERPL CALCULATED.3IONS-SCNC: 11 MEQ/L (ref 8–16)
BASOPHILS # BLD: 0.3 %
BASOPHILS ABSOLUTE: 0 THOU/MM3 (ref 0–0.1)
BUN BLDV-MCNC: 10 MG/DL (ref 7–22)
CALCIUM SERPL-MCNC: 9.3 MG/DL (ref 8.5–10.5)
CHLORIDE BLD-SCNC: 106 MEQ/L (ref 98–111)
CO2: 25 MEQ/L (ref 23–33)
CREAT SERPL-MCNC: 0.7 MG/DL (ref 0.4–1.2)
EKG ATRIAL RATE: 115 BPM
EKG P AXIS: 52 DEGREES
EKG P-R INTERVAL: 140 MS
EKG Q-T INTERVAL: 312 MS
EKG QRS DURATION: 68 MS
EKG QTC CALCULATION (BAZETT): 431 MS
EKG R AXIS: 40 DEGREES
EKG T AXIS: 40 DEGREES
EKG VENTRICULAR RATE: 115 BPM
EOSINOPHIL # BLD: 2.4 %
EOSINOPHILS ABSOLUTE: 0.2 THOU/MM3 (ref 0–0.4)
ERYTHROCYTE [DISTWIDTH] IN BLOOD BY AUTOMATED COUNT: 13.9 % (ref 11.5–14.5)
ERYTHROCYTE [DISTWIDTH] IN BLOOD BY AUTOMATED COUNT: 46.9 FL (ref 35–45)
GFR SERPL CREATININE-BSD FRML MDRD: > 90 ML/MIN/1.73M2
GLUCOSE BLD-MCNC: 93 MG/DL (ref 70–108)
HCT VFR BLD CALC: 33.1 % (ref 37–47)
HEMOGLOBIN: 10.7 GM/DL (ref 12–16)
IMMATURE GRANS (ABS): 0.11 THOU/MM3 (ref 0–0.07)
IMMATURE GRANULOCYTES: 1 %
LYMPHOCYTES # BLD: 17.9 %
LYMPHOCYTES ABSOLUTE: 1.8 THOU/MM3 (ref 1–4.8)
MAGNESIUM: 2 MG/DL (ref 1.6–2.4)
MCH RBC QN AUTO: 30 PG (ref 26–33)
MCHC RBC AUTO-ENTMCNC: 32.3 GM/DL (ref 32.2–35.5)
MCV RBC AUTO: 92.7 FL (ref 81–99)
MONOCYTES # BLD: 5.8 %
MONOCYTES ABSOLUTE: 0.6 THOU/MM3 (ref 0.4–1.3)
NUCLEATED RED BLOOD CELLS: 0 /100 WBC
OSMOLALITY CALCULATION: 281.9 MOSMOL/KG (ref 275–300)
PLATELET # BLD: 275 THOU/MM3 (ref 130–400)
PMV BLD AUTO: 9.4 FL (ref 9.4–12.4)
POTASSIUM SERPL-SCNC: 4 MEQ/L (ref 3.5–5.2)
PREGNANCY, SERUM: POSITIVE
PRO-BNP: 151.1 PG/ML (ref 0–450)
RBC # BLD: 3.57 MILL/MM3 (ref 4.2–5.4)
SEG NEUTROPHILS: 72.5 %
SEGMENTED NEUTROPHILS ABSOLUTE COUNT: 7.4 THOU/MM3 (ref 1.8–7.7)
SODIUM BLD-SCNC: 142 MEQ/L (ref 135–145)
TROPONIN T: < 0.01 NG/ML
TSH SERPL DL<=0.05 MIU/L-ACNC: 1.04 UIU/ML (ref 0.4–4.2)
WBC # BLD: 10.2 THOU/MM3 (ref 4.8–10.8)

## 2019-08-12 PROCEDURE — 99285 EMERGENCY DEPT VISIT HI MDM: CPT

## 2019-08-12 PROCEDURE — 71275 CT ANGIOGRAPHY CHEST: CPT

## 2019-08-12 PROCEDURE — 84703 CHORIONIC GONADOTROPIN ASSAY: CPT

## 2019-08-12 PROCEDURE — 80048 BASIC METABOLIC PNL TOTAL CA: CPT

## 2019-08-12 PROCEDURE — 6360000004 HC RX CONTRAST MEDICATION: Performed by: FAMILY MEDICINE

## 2019-08-12 PROCEDURE — 84484 ASSAY OF TROPONIN QUANT: CPT

## 2019-08-12 PROCEDURE — 83735 ASSAY OF MAGNESIUM: CPT

## 2019-08-12 PROCEDURE — 36415 COLL VENOUS BLD VENIPUNCTURE: CPT

## 2019-08-12 PROCEDURE — 93005 ELECTROCARDIOGRAM TRACING: CPT | Performed by: FAMILY MEDICINE

## 2019-08-12 PROCEDURE — 83880 ASSAY OF NATRIURETIC PEPTIDE: CPT

## 2019-08-12 PROCEDURE — 84443 ASSAY THYROID STIM HORMONE: CPT

## 2019-08-12 PROCEDURE — 93010 ELECTROCARDIOGRAM REPORT: CPT | Performed by: INTERNAL MEDICINE

## 2019-08-12 PROCEDURE — 85025 COMPLETE CBC W/AUTO DIFF WBC: CPT

## 2019-08-12 PROCEDURE — 6370000000 HC RX 637 (ALT 250 FOR IP): Performed by: FAMILY MEDICINE

## 2019-08-12 RX ORDER — HYDROXYZINE HYDROCHLORIDE 25 MG/1
25 TABLET, FILM COATED ORAL ONCE
Status: COMPLETED | OUTPATIENT
Start: 2019-08-12 | End: 2019-08-12

## 2019-08-12 RX ORDER — FAMOTIDINE 20 MG/1
20 TABLET, FILM COATED ORAL 2 TIMES DAILY
Qty: 10 TABLET | Refills: 0 | Status: SHIPPED | OUTPATIENT
Start: 2019-08-12 | End: 2021-09-08 | Stop reason: ALTCHOICE

## 2019-08-12 RX ADMIN — HYDROXYZINE HYDROCHLORIDE 25 MG: 25 TABLET, FILM COATED ORAL at 16:29

## 2019-08-12 RX ADMIN — IOPAMIDOL 80 ML: 755 INJECTION, SOLUTION INTRAVENOUS at 16:02

## 2019-08-12 ASSESSMENT — PAIN SCALES - GENERAL: PAINLEVEL_OUTOF10: 4

## 2019-08-12 ASSESSMENT — ENCOUNTER SYMPTOMS
COUGH: 0
ABDOMINAL PAIN: 0
VOMITING: 0
NAUSEA: 0
SORE THROAT: 0
EYE PAIN: 0
WHEEZING: 0
BACK PAIN: 0
DIARRHEA: 0
RHINORRHEA: 0
EYE DISCHARGE: 0
SHORTNESS OF BREATH: 0

## 2019-08-12 ASSESSMENT — PAIN DESCRIPTION - ORIENTATION: ORIENTATION: MID

## 2019-08-12 ASSESSMENT — PAIN DESCRIPTION - DESCRIPTORS: DESCRIPTORS: ACHING;DULL

## 2019-08-12 ASSESSMENT — PAIN DESCRIPTION - ONSET: ONSET: ON-GOING

## 2019-08-12 ASSESSMENT — PAIN DESCRIPTION - PROGRESSION: CLINICAL_PROGRESSION: NOT CHANGED

## 2019-08-12 ASSESSMENT — PAIN DESCRIPTION - FREQUENCY: FREQUENCY: CONTINUOUS

## 2019-08-12 ASSESSMENT — PAIN DESCRIPTION - LOCATION: LOCATION: ABDOMEN;CHEST

## 2019-08-12 NOTE — ED PROVIDER NOTES
in the documentation, reviewed and edited the documentation which was dictated to the scribe in my presence, and it accurately records mywords and actions.     Bernabe Duncan MD 8/12/19 6:03 PM                  Bernabe Duncan MD  08/13/19 0027

## 2021-01-25 ENCOUNTER — APPOINTMENT (OUTPATIENT)
Dept: GENERAL RADIOLOGY | Age: 26
End: 2021-01-25
Payer: COMMERCIAL

## 2021-01-25 ENCOUNTER — HOSPITAL ENCOUNTER (EMERGENCY)
Age: 26
Discharge: HOME | End: 2021-01-25
Payer: COMMERCIAL

## 2021-01-25 VITALS
BODY MASS INDEX: 30.73 KG/M2 | SYSTOLIC BLOOD PRESSURE: 107 MMHG | OXYGEN SATURATION: 100 % | HEIGHT: 64 IN | DIASTOLIC BLOOD PRESSURE: 75 MMHG | RESPIRATION RATE: 14 BRPM | TEMPERATURE: 98.7 F | HEART RATE: 98 BPM | WEIGHT: 180 LBS

## 2021-01-25 DIAGNOSIS — R07.9 CHEST PAIN WITH LOW RISK FOR CARDIAC ETIOLOGY: Primary | ICD-10-CM

## 2021-01-25 DIAGNOSIS — F41.9 ANXIETY: ICD-10-CM

## 2021-01-25 LAB
ANION GAP SERPL CALCULATED.3IONS-SCNC: 13 MEQ/L (ref 8–16)
BASOPHILS # BLD: 0.3 %
BASOPHILS ABSOLUTE: 0 THOU/MM3 (ref 0–0.1)
BUN BLDV-MCNC: 20 MG/DL (ref 7–22)
CALCIUM SERPL-MCNC: 9.4 MG/DL (ref 8.5–10.5)
CHLORIDE BLD-SCNC: 104 MEQ/L (ref 98–111)
CO2: 24 MEQ/L (ref 23–33)
CREAT SERPL-MCNC: 0.6 MG/DL (ref 0.4–1.2)
EKG ATRIAL RATE: 107 BPM
EKG P AXIS: 71 DEGREES
EKG P-R INTERVAL: 134 MS
EKG Q-T INTERVAL: 328 MS
EKG QRS DURATION: 70 MS
EKG QTC CALCULATION (BAZETT): 437 MS
EKG R AXIS: 84 DEGREES
EKG T AXIS: 47 DEGREES
EKG VENTRICULAR RATE: 107 BPM
EOSINOPHIL # BLD: 0.4 %
EOSINOPHILS ABSOLUTE: 0 THOU/MM3 (ref 0–0.4)
ERYTHROCYTE [DISTWIDTH] IN BLOOD BY AUTOMATED COUNT: 12.2 % (ref 11.5–14.5)
ERYTHROCYTE [DISTWIDTH] IN BLOOD BY AUTOMATED COUNT: 40.1 FL (ref 35–45)
GFR SERPL CREATININE-BSD FRML MDRD: > 90 ML/MIN/1.73M2
GLUCOSE BLD-MCNC: 105 MG/DL (ref 70–108)
HCT VFR BLD CALC: 41.3 % (ref 37–47)
HEMOGLOBIN: 14 GM/DL (ref 12–16)
IMMATURE GRANS (ABS): 0.02 THOU/MM3 (ref 0–0.07)
IMMATURE GRANULOCYTES: 0.3 %
LYMPHOCYTES # BLD: 16.3 %
LYMPHOCYTES ABSOLUTE: 1.3 THOU/MM3 (ref 1–4.8)
MAGNESIUM: 1.9 MG/DL (ref 1.6–2.4)
MCH RBC QN AUTO: 30.4 PG (ref 26–33)
MCHC RBC AUTO-ENTMCNC: 33.9 GM/DL (ref 32.2–35.5)
MCV RBC AUTO: 89.8 FL (ref 81–99)
MONOCYTES # BLD: 7.3 %
MONOCYTES ABSOLUTE: 0.6 THOU/MM3 (ref 0.4–1.3)
NUCLEATED RED BLOOD CELLS: 0 /100 WBC
OSMOLALITY CALCULATION: 284.2 MOSMOL/KG (ref 275–300)
PLATELET # BLD: 265 THOU/MM3 (ref 130–400)
PMV BLD AUTO: 9.5 FL (ref 9.4–12.4)
POTASSIUM SERPL-SCNC: 4 MEQ/L (ref 3.5–5.2)
PRO-BNP: 17.9 PG/ML (ref 0–450)
RBC # BLD: 4.6 MILL/MM3 (ref 4.2–5.4)
SEG NEUTROPHILS: 75.4 %
SEGMENTED NEUTROPHILS ABSOLUTE COUNT: 5.9 THOU/MM3 (ref 1.8–7.7)
SODIUM BLD-SCNC: 141 MEQ/L (ref 135–145)
TROPONIN T: < 0.01 NG/ML
WBC # BLD: 7.8 THOU/MM3 (ref 4.8–10.8)

## 2021-01-25 PROCEDURE — 83735 ASSAY OF MAGNESIUM: CPT

## 2021-01-25 PROCEDURE — 83880 ASSAY OF NATRIURETIC PEPTIDE: CPT

## 2021-01-25 PROCEDURE — 93005 ELECTROCARDIOGRAM TRACING: CPT | Performed by: FAMILY MEDICINE

## 2021-01-25 PROCEDURE — 71045 X-RAY EXAM CHEST 1 VIEW: CPT

## 2021-01-25 PROCEDURE — 85025 COMPLETE CBC W/AUTO DIFF WBC: CPT

## 2021-01-25 PROCEDURE — 36415 COLL VENOUS BLD VENIPUNCTURE: CPT

## 2021-01-25 PROCEDURE — 84484 ASSAY OF TROPONIN QUANT: CPT

## 2021-01-25 PROCEDURE — 80048 BASIC METABOLIC PNL TOTAL CA: CPT

## 2021-01-25 PROCEDURE — 99283 EMERGENCY DEPT VISIT LOW MDM: CPT

## 2021-01-25 ASSESSMENT — ENCOUNTER SYMPTOMS
ABDOMINAL PAIN: 0
SHORTNESS OF BREATH: 0
COUGH: 0
COLOR CHANGE: 0
NAUSEA: 0
VOMITING: 0

## 2021-01-25 ASSESSMENT — PAIN DESCRIPTION - DESCRIPTORS: DESCRIPTORS: ACHING

## 2021-01-25 ASSESSMENT — PAIN DESCRIPTION - PAIN TYPE: TYPE: ACUTE PAIN

## 2021-01-25 ASSESSMENT — PAIN DESCRIPTION - ORIENTATION: ORIENTATION: MID;LEFT

## 2021-01-25 ASSESSMENT — PAIN DESCRIPTION - LOCATION: LOCATION: CHEST

## 2021-01-26 NOTE — ED NOTES
Pt arrives to the ED for mid sternal chest pain rating a 2/10. Pt states her pain was worse at home. Pt also has bilateral feet numbness and left arm numbness.  EKG completed      Juwan Gunn RN  01/25/21 1956

## 2021-01-26 NOTE — ED PROVIDER NOTES
[facility] Emergency Department    CHIEF COMPLAINT       Chief Complaint   Patient presents with    Chest Pain    Numbness       Nurses Notes reviewed and I agree except as noted in the HPI. HISTORY OF PRESENT ILLNESS    Soto Elizabeth is a 23 y.o. female who presents to the ED for evaluation of chest pain and bilateral feet numbness. She states that the chest pain started around 4pm and is aching, 2/10, and localized to the left side of the chest at approximately the mid-sternal height. The pain occurs every 10 minutes and lasts 1-2 seconds. She denies any aggravating or alleviating factors to the pain. Denies any new activities. States her feet became numb bilaterally around 6pm. She denies personal or family history of cardiovascular issues or diabetes. She denies palpitations, dyspnea, or leg edema. Reports a history of anxiety with one instance of having chest pain that resembled this presentation today. States that anxiety has been present since [facility]. She was treated with zoloft around 17 months ago. Reports she was on zoloft for a few months but nausea made her cease taking it. She states she was not taking it as prescribed and missing pills some days. She would prefer taking a longer lasting anxiolytic. She endorses she experiences generalized anxiousness/excessive worry, difficulty sleeping, irritability, derealizations, and at times being overwhelmed. She states she is in the process of getting a PCP. Experienced previously: Yes    HPI was provided by the patient. REVIEW OF SYSTEMS     Review of Systems   Constitutional: Negative for chills, diaphoresis and fever. Eyes: Negative for visual disturbance. Respiratory: Negative for cough and shortness of breath. Cardiovascular: Positive for chest pain. Negative for palpitations and leg swelling. Gastrointestinal: Negative for abdominal pain, nausea and vomiting. Musculoskeletal: Negative for arthralgias and myalgias.    Skin: Negative for color change, pallor, rash and wound. Allergic/Immunologic: Negative for immunocompromised state. Neurological: Positive for numbness. Negative for dizziness, weakness, light-headedness and headaches. Psychiatric/Behavioral: Positive for decreased concentration and sleep disturbance. Negative for confusion, hallucinations, self-injury and suicidal ideas. The patient is nervous/anxious. All other systems negative except as noted. PAST MEDICAL HISTORY   No past medical history on file. SURGICALHISTORY      has no past surgical history on file. CURRENT MEDICATIONS       Discharge Medication List as of 1/25/2021  9:43 PM      CONTINUE these medications which have NOT CHANGED    Details   famotidine (PEPCID) 20 MG tablet Take 1 tablet by mouth 2 times daily for 5 days, Disp-10 tablet, R-0Print      ondansetron (ZOFRAN) 4 MG tablet Take 4 mg by mouth every 8 hours as needed for Nausea or VomitingHistorical Med             ALLERGIES     is allergic to keflex [cephalexin]. FAMILY HISTORY     has no family status information on file. family history is not on file.     SOCIAL HISTORY       Social History     Socioeconomic History    Marital status: Single     Spouse name: Not on file    Number of children: Not on file    Years of education: Not on file    Highest education level: Not on file   Occupational History    Not on file   Social Needs    Financial resource strain: Not on file    Food insecurity     Worry: Not on file     Inability: Not on file    Transportation needs     Medical: Not on file     Non-medical: Not on file   Tobacco Use    Smoking status: Never Smoker    Smokeless tobacco: Never Used   Substance and Sexual Activity    Alcohol use: No    Drug use: No    Sexual activity: Yes     Partners: Male   Lifestyle    Physical activity     Days per week: Not on file     Minutes per session: Not on file    Stress: Not on file   Relationships    Social connections     Talks on phone: Not on file     Gets together: Not on file     Attends Sikh service: Not on file     Active member of club or organization: Not on file     Attends meetings of clubs or organizations: Not on file     Relationship status: Not on file    Intimate partner violence     Fear of current or ex partner: Not on file     Emotionally abused: Not on file     Physically abused: Not on file     Forced sexual activity: Not on file   Other Topics Concern    Not on file   Social History Narrative    Not on file       PHYSICAL EXAM     INITIAL VITALS:  height is 5' 4\" (1.626 m) and weight is 180 lb (81.6 kg). Her oral temperature is 98.7 °F (37.1 °C). Her blood pressure is 107/75 and her pulse is 98. Her respiration is 14 and oxygen saturation is 100%. Physical Exam  Constitutional:       General: She is not in acute distress. Appearance: She is obese. She is not ill-appearing or diaphoretic. HENT:      Head: Normocephalic and atraumatic. Right Ear: External ear normal.      Left Ear: External ear normal.      Nose: Nose normal.      Mouth/Throat:      Mouth: Mucous membranes are moist.      Pharynx: Oropharynx is clear. Eyes:      Extraocular Movements: Extraocular movements intact. Conjunctiva/sclera: Conjunctivae normal.      Pupils: Pupils are equal, round, and reactive to light. Neck:      Musculoskeletal: Normal range of motion and neck supple. Cardiovascular:      Rate and Rhythm: Normal rate and regular rhythm. Pulses: Normal pulses. Heart sounds: Normal heart sounds. Pulmonary:      Effort: Pulmonary effort is normal.      Breath sounds: Normal breath sounds. Abdominal:      General: Bowel sounds are normal.      Palpations: Abdomen is soft. Tenderness: There is no abdominal tenderness. Musculoskeletal: Normal range of motion. Right lower leg: No edema. Left lower leg: No edema. Skin:     General: Skin is warm and dry. Capillary Refill: Capillary refill takes less than 2 seconds. Coloration: Skin is not pale. Findings: No bruising, erythema or rash. Neurological:      Mental Status: She is alert and oriented to person, place, and time. Mental status is at baseline. Sensory: No sensory deficit. Motor: No weakness. Psychiatric:         Attention and Perception: Attention and perception normal. She is attentive. Mood and Affect: Mood is anxious. Mood is not depressed. Affect is not labile, blunt, flat, angry, tearful or inappropriate. Speech: Speech normal.         Behavior: Behavior normal. Behavior is not agitated, slowed or withdrawn. Behavior is cooperative. Thought Content: Thought content normal. Thought content does not include homicidal or suicidal ideation. Judgment: Judgment normal.         DIFFERENTIAL DIAGNOSIS:   Generalized anxiety, panic attack, angina    DIAGNOSTIC RESULTS     EKG: All EKG's are interpreted by the Emergency Department Physician who eithersigns or Co-signs this chart in the absence of a cardiologist.     EKG 12 Lead (Preliminary result)   Component (Lab Inquiry)  Collection Time Result Time Ventricular Rate Atrial Rate P-R Interval QRS Duration Q-T Interval QTc Calculation (Bazett) P Axis R Axis T Axis   01/25/21 19:38:45 01/25/21 21:09:50 107 107 134 70 328 437 71 84 47   Previous Results   08/12/19 14:24:33 08/12/19 14:24:33 115 115 140 68 312 431 52 40 40         Preliminary result                Narrative:    Sinus tachycardia   Otherwise normal ECG   When compared with ECG of 12-AUG-2019 14:24,   No significant change was found                    RADIOLOGY: non-plainfilm images(s) such as CT, Ultrasound and MRI are read by the radiologist.  Plain radiographic images are visualized and preliminarily interpreted by the emergency physician unless otherwise stated below.   XR CHEST PORTABLE   Final Result   No acute findings               **This report has been created using voice recognition software. It may contain minor errors which are inherent in voice recognition technology. **      Final report electronically signed by Dr. Jayy Olvera on 1/25/2021 8:40 PM            LABS:   Labs Reviewed   BRAIN NATRIURETIC PEPTIDE   TROPONIN   CBC WITH AUTO DIFFERENTIAL   BASIC METABOLIC PANEL   MAGNESIUM   ANION GAP   GLOMERULAR FILTRATION RATE, ESTIMATED   OSMOLALITY       EMERGENCY DEPARTMENT COURSE:   Vitals:    Vitals:    01/25/21 1940 01/25/21 1942 01/25/21 2122   BP:  (!) 100/7 107/75   Pulse: 89  98   Resp: 18  14   Temp: 98.7 °F (37.1 °C)     TempSrc: Oral     SpO2: 99%  100%   Weight: 180 lb (81.6 kg)     Height: 5' 4\" (1.626 m)           [facility]    Patient seen evaluate in the emergency room for chest pain. Pain came while patient was very anxious. She noted that the anxiety made it worse. Patient states that the pain improved after she was no longer anxious. I reviewed findings from the lab work with the patient. She has no risk factors for PE or for ACS. .  EKG is normal.  She is not tachycardic. Troponin is negative. I believe that the patient is appropriate and stable for follow-up. I offered to schedule her into the family medicine clinic but she states she has a PCP. Patient is discharged in stable condition with instructions to follow-up with her PCP. Medications - No data to display      Patient was seen independently by myself. The patient's final impression and disposition and plan was determined by myself. Strict return precautions and follow up instructions were discussed with the patient prior to discharge, with which the patient agrees. Physical assessment findings, diagnostic testing(s) if applicable, and vital signs reviewed with patient/patient representative. Questions answered. Medications asdirected, including OTC medications for supportive care.    Education provided on medications. Differential diagnosis(s) discussed with patient/patient representative. Home care/self care instructions reviewed withpatient/patient representative. Patient is to follow-up with family care provider in 2-3 days if no improvement. Patient is to go to the emergency department if symptoms worsen. Patient/patient representative isaware of care plan, questions answered, verbalizes understanding and is in agreement. CRITICAL CARE:   None    CONSULTS:  None    PROCEDURES:  None    FINAL IMPRESSION     1. Chest pain with low risk for cardiac etiology    2.  Anxiety          DISPOSITION/PLAN   DISPOSITION Decision To Discharge 01/25/2021 09:40:01 PM      PATIENT REFERREDTO:  May Dickey  [address] [place] [address]  [phone]    Schedule an appointment as soon as possible for a visit in 2 days  For follow up      DISCHARGE MEDICATIONS:  Discharge Medication List as of 1/25/2021  9:43 PM          (Please note that portions of this note were completed with a voice recognition program.  Efforts were made to edit the dictations but occasionally words are mis-transcribed.)         LATRICIA Chiu CNP, APRN - CNP  01/26/21 1694

## 2021-02-12 ENCOUNTER — NURSE ONLY (OUTPATIENT)
Dept: LAB | Age: 26
End: 2021-02-12

## 2021-02-16 ENCOUNTER — APPOINTMENT (OUTPATIENT)
Dept: ULTRASOUND IMAGING | Age: 26
End: 2021-02-16
Payer: COMMERCIAL

## 2021-02-16 ENCOUNTER — HOSPITAL ENCOUNTER (EMERGENCY)
Age: 26
Discharge: HOME | End: 2021-02-16
Payer: COMMERCIAL

## 2021-02-16 VITALS
TEMPERATURE: 97.9 F | DIASTOLIC BLOOD PRESSURE: 64 MMHG | OXYGEN SATURATION: 99 % | SYSTOLIC BLOOD PRESSURE: 106 MMHG | RESPIRATION RATE: 18 BRPM | HEART RATE: 92 BPM

## 2021-02-16 DIAGNOSIS — O21.9 EXCESSIVE VOMITING IN PREGNANCY: Primary | ICD-10-CM

## 2021-02-16 LAB
ANION GAP SERPL CALCULATED.3IONS-SCNC: 10 MEQ/L (ref 8–16)
BASOPHILS # BLD: 0.4 %
BASOPHILS ABSOLUTE: 0 THOU/MM3 (ref 0–0.1)
BUN BLDV-MCNC: 17 MG/DL (ref 7–22)
CALCIUM SERPL-MCNC: 9.4 MG/DL (ref 8.5–10.5)
CHLORIDE BLD-SCNC: 100 MEQ/L (ref 98–111)
CO2: 25 MEQ/L (ref 23–33)
CREAT SERPL-MCNC: 0.6 MG/DL (ref 0.4–1.2)
EOSINOPHIL # BLD: 0.2 %
EOSINOPHILS ABSOLUTE: 0 THOU/MM3 (ref 0–0.4)
ERYTHROCYTE [DISTWIDTH] IN BLOOD BY AUTOMATED COUNT: 12.2 % (ref 11.5–14.5)
ERYTHROCYTE [DISTWIDTH] IN BLOOD BY AUTOMATED COUNT: 39.9 FL (ref 35–45)
GFR SERPL CREATININE-BSD FRML MDRD: > 90 ML/MIN/1.73M2
GLUCOSE BLD-MCNC: 97 MG/DL (ref 70–108)
HCG,BETA SUBUNIT,QUAL,SERUM: ABNORMAL MIU/ML (ref 0–5)
HCT VFR BLD CALC: 43.1 % (ref 37–47)
HEMOGLOBIN: 14.6 GM/DL (ref 12–16)
IMMATURE GRANS (ABS): 0.03 THOU/MM3 (ref 0–0.07)
IMMATURE GRANULOCYTES: 0.3 %
LYMPHOCYTES # BLD: 11.7 %
LYMPHOCYTES ABSOLUTE: 1.2 THOU/MM3 (ref 1–4.8)
MCH RBC QN AUTO: 30.7 PG (ref 26–33)
MCHC RBC AUTO-ENTMCNC: 33.9 GM/DL (ref 32.2–35.5)
MCV RBC AUTO: 90.7 FL (ref 81–99)
MONOCYTES # BLD: 6.9 %
MONOCYTES ABSOLUTE: 0.7 THOU/MM3 (ref 0.4–1.3)
NUCLEATED RED BLOOD CELLS: 0 /100 WBC
OSMOLALITY CALCULATION: 271.6 MOSMOL/KG (ref 275–300)
PLATELET # BLD: 260 THOU/MM3 (ref 130–400)
PMV BLD AUTO: 9.5 FL (ref 9.4–12.4)
POTASSIUM REFLEX MAGNESIUM: 3.7 MEQ/L (ref 3.5–5.2)
RBC # BLD: 4.75 MILL/MM3 (ref 4.2–5.4)
SEG NEUTROPHILS: 80.5 %
SEGMENTED NEUTROPHILS ABSOLUTE COUNT: 8.2 THOU/MM3 (ref 1.8–7.7)
SODIUM BLD-SCNC: 135 MEQ/L (ref 135–145)
WBC # BLD: 10.2 THOU/MM3 (ref 4.8–10.8)

## 2021-02-16 PROCEDURE — 80048 BASIC METABOLIC PNL TOTAL CA: CPT

## 2021-02-16 PROCEDURE — 96374 THER/PROPH/DIAG INJ IV PUSH: CPT

## 2021-02-16 PROCEDURE — 84702 CHORIONIC GONADOTROPIN TEST: CPT

## 2021-02-16 PROCEDURE — 76817 TRANSVAGINAL US OBSTETRIC: CPT

## 2021-02-16 PROCEDURE — 99284 EMERGENCY DEPT VISIT MOD MDM: CPT

## 2021-02-16 PROCEDURE — 36415 COLL VENOUS BLD VENIPUNCTURE: CPT

## 2021-02-16 PROCEDURE — 6360000002 HC RX W HCPCS: Performed by: NURSE PRACTITIONER

## 2021-02-16 PROCEDURE — 85025 COMPLETE CBC W/AUTO DIFF WBC: CPT

## 2021-02-16 PROCEDURE — 2580000003 HC RX 258: Performed by: NURSE PRACTITIONER

## 2021-02-16 PROCEDURE — 96361 HYDRATE IV INFUSION ADD-ON: CPT

## 2021-02-16 RX ORDER — ONDANSETRON 2 MG/ML
4 INJECTION INTRAMUSCULAR; INTRAVENOUS ONCE
Status: COMPLETED | OUTPATIENT
Start: 2021-02-16 | End: 2021-02-16

## 2021-02-16 RX ORDER — 0.9 % SODIUM CHLORIDE 0.9 %
1000 INTRAVENOUS SOLUTION INTRAVENOUS ONCE
Status: COMPLETED | OUTPATIENT
Start: 2021-02-16 | End: 2021-02-16

## 2021-02-16 RX ADMIN — SODIUM CHLORIDE 1000 ML: 9 INJECTION, SOLUTION INTRAVENOUS at 11:29

## 2021-02-16 RX ADMIN — ONDANSETRON 4 MG: 2 INJECTION INTRAMUSCULAR; INTRAVENOUS at 11:29

## 2021-02-16 ASSESSMENT — ENCOUNTER SYMPTOMS
SHORTNESS OF BREATH: 0
WHEEZING: 0
COLOR CHANGE: 0
BACK PAIN: 0
SINUS PAIN: 0
TROUBLE SWALLOWING: 0
CONSTIPATION: 0
PHOTOPHOBIA: 0
DIARRHEA: 0
COUGH: 0
RHINORRHEA: 0
SINUS PRESSURE: 0
SORE THROAT: 0
NAUSEA: 1
VOMITING: 1
CHEST TIGHTNESS: 0
ABDOMINAL PAIN: 1

## 2021-02-16 NOTE — ED PROVIDER NOTES
[facility] COMPLAINT       Chief Complaint   Patient presents with    Dehydration       Nurses Notes reviewed and I agree except as noted in the HPI. HISTORY OF PRESENT ILLNESS    Fang Mckeon is a 23 y.o. female who presents to the Emergency Department for the evaluation of emesis and possible dehydration in pregnancy. Patient is G2, P1, LMP was December 30, has not had her first OB appointment yet. States that with her first pregnancy she was diagnosed with hyperemesis gravidarum, has been prescribed Reglan with this pregnancy without relief, was switched to Zofran however she has not gotten that prescription filled yet. She contacted her OB office and was instructed to come to the emergency department for evaluation of possible dehydration due to hyperemesis since Sunday. She reports some associated lower abdominal cramping, denies vaginal bleeding or discharge      The HPI was provided by the patient. REVIEW OF SYSTEMS     Review of Systems   Constitutional: Negative for chills, diaphoresis, fatigue and fever. HENT: Negative for congestion, ear pain, nosebleeds, rhinorrhea, sinus pressure, sinus pain, sore throat and trouble swallowing. Eyes: Negative for photophobia. Respiratory: Negative for cough, chest tightness, shortness of breath and wheezing. Cardiovascular: Negative for chest pain and palpitations. Gastrointestinal: Positive for abdominal pain, nausea and vomiting. Negative for constipation and diarrhea. Endocrine: Negative for cold intolerance and heat intolerance. Genitourinary: Negative for difficulty urinating, dysuria, flank pain, hematuria, pelvic pain, vaginal bleeding, vaginal discharge and vaginal pain. Musculoskeletal: Negative for arthralgias, back pain, joint swelling, myalgias and neck stiffness. Skin: Negative for color change and wound.    Neurological: Negative for dizziness, weakness, light-headedness, numbness and headaches. Psychiatric/Behavioral: Negative for agitation, behavioral problems, confusion, hallucinations, self-injury and suicidal ideas. The patient is not nervous/anxious. PAST MEDICAL HISTORY    has no past medical history on file. SURGICAL HISTORY      has no past surgical history on file. CURRENT MEDICATIONS       Discharge Medication List as of 2/16/2021  1:26 PM      CONTINUE these medications which have NOT CHANGED    Details   famotidine (PEPCID) 20 MG tablet Take 1 tablet by mouth 2 times daily for 5 days, Disp-10 tablet, R-0Print      ondansetron (ZOFRAN) 4 MG tablet Take 4 mg by mouth every 8 hours as needed for Nausea or VomitingHistorical Med             ALLERGIES     is allergic to keflex [cephalexin]. FAMILY HISTORY     has no family status information on file. family history is not on file. SOCIAL HISTORY      reports that she has never smoked. She has never used smokeless tobacco. She reports that she does not drink alcohol or use drugs. PHYSICAL EXAM     INITIAL VITALS:  oral temperature is 97.9 °F (36.6 °C). Her blood pressure is 106/64 and her pulse is 92. Her respiration is 18 and oxygen saturation is 99%. Physical Exam  Vitals signs and nursing note reviewed. Constitutional:       General: She is awake. She is not in acute distress. Appearance: Normal appearance. She is well-developed and normal weight. She is ill-appearing. She is not toxic-appearing or diaphoretic. HENT:      Head: Normocephalic and atraumatic. Right Ear: Tympanic membrane normal.      Left Ear: Tympanic membrane normal.      Nose: Nose normal.      Mouth/Throat:      Mouth: Mucous membranes are moist.      Pharynx: Oropharynx is clear. Eyes:      Extraocular Movements: Extraocular movements intact. Pupils: Pupils are equal, round, and reactive to light. Neck:      Musculoskeletal: Normal range of motion and neck supple.  No neck rigidity or muscular tenderness. Vascular: No carotid bruit. Cardiovascular:      Rate and Rhythm: Normal rate and regular rhythm. Pulses: Normal pulses. Heart sounds: Normal heart sounds, S1 normal and S2 normal. Heart sounds not distant. No murmur. No friction rub. No gallop. Pulmonary:      Effort: Pulmonary effort is normal. No tachypnea, bradypnea, accessory muscle usage, prolonged expiration, respiratory distress or retractions. Breath sounds: Normal breath sounds. Abdominal:      General: Abdomen is flat. Bowel sounds are normal. There is no distension or abdominal bruit. There are no signs of injury. Palpations: Abdomen is soft. There is no shifting dullness, fluid wave, hepatomegaly, splenomegaly, mass or pulsatile mass. Tenderness: There is no abdominal tenderness. There is no guarding or rebound. Hernia: No hernia is present. Musculoskeletal: Normal range of motion. General: No swelling, tenderness, deformity or signs of injury. Right lower leg: No edema. Left lower leg: No edema. Lymphadenopathy:      Cervical: No cervical adenopathy. Skin:     General: Skin is warm and dry. Capillary Refill: Capillary refill takes less than 2 seconds. Neurological:      General: No focal deficit present. Mental Status: She is alert and oriented to person, place, and time. Mental status is at baseline. GCS: GCS eye subscore is 4. GCS verbal subscore is 5. GCS motor subscore is 6. Cranial Nerves: Cranial nerves are intact. Psychiatric:         Attention and Perception: Attention normal.         Mood and Affect: Mood normal.         Speech: Speech normal.         Behavior: Behavior normal. Behavior is cooperative. Thought Content:  Thought content normal.         Cognition and Memory: Cognition and memory normal.         Judgment: Judgment normal.          DIFFERENTIAL DIAGNOSIS:   Dehydration, miscarriage, hyperemesis gravidarum, emesis and pregnancy    DIAGNOSTIC RESULTS     EKG: All EKG's are interpreted by the Emergency Department Physician who either signs or Co-signs this chart in the absence of a cardiologist.    None    RADIOLOGY: non-plainfilm images(s) such as CT, Ultrasound and MRI are read by the radiologist.    US OB TRANSVAGINAL   Final Result       1. Single live intrauterine pregnancy with gestational age of 7 weeks and 6 days and PARI of 10/6/2021.   2. Small amount of free fluid within the pelvis. 3. Probable left ovarian cyst measuring 2 x 1.9 cm in size. **This report has been created using voice recognition software. It may contain minor errors which are inherent in voice recognition technology. **         Final report electronically signed by DR Kirstin Dietrich on 2/16/2021 1:17 PM          LABS:     Labs Reviewed   CBC WITH AUTO DIFFERENTIAL - Abnormal; Notable for the following components:       Result Value    Segs Absolute 8.2 (*)     All other components within normal limits   HCG, QUANTITATIVE, PREGNANCY - Abnormal; Notable for the following components:    hCG,Beta Subunit,Qual,Serum 00963.0 (*)     All other components within normal limits   OSMOLALITY - Abnormal; Notable for the following components:    Osmolality Calc 271.6 (*)     All other components within normal limits   BASIC METABOLIC PANEL W/ REFLEX TO MG FOR LOW K   ANION GAP   GLOMERULAR FILTRATION RATE, ESTIMATED       EMERGENCY DEPARTMENT COURSE:   Vitals:    Vitals:    02/16/21 1058 02/16/21 1200   BP: 121/79 106/64   Pulse: 97 92   Resp: 18    Temp: 97.9 °F (36.6 °C)    TempSrc: Oral    SpO2: 98% 99%       10:58 AM EST: The patient was seen and evaluated. 1106- Ultrasound, basic labs, fluid bolus ordered    MDM:  Patient seen and evaluated today for vomiting in pregnancy. Concern for dehydration. Based labs were ordered, patient has not had a previous ultrasound to confirm IUP and is complaining of some abdominal cramping. US completed showing 6-week 6-day gestational age live IUP. Patient was hydrated with 1 L IV fluids. No emesis noted throughout ED stay. She has been prescribed Zofran by her OB/GYN. She is instructed to continue to follow with her OB/GYN and to return to the emergency department if symptoms become severe. CRITICAL CARE:   none    CONSULTS:  none    PROCEDURES:  none    FINAL IMPRESSION      1. Excessive vomiting in pregnancy          DISPOSITION/PLAN   Discharge    PATIENT REFERRED TO:  Lucio Greco MD  [facility]  [facility] [address]  [phone]    Call in 2 days      [facility] EMERGENCY DEPT  [address]  Go to   If symptoms worsen      DISCHARGE MEDICATIONS:  Discharge Medication List as of 2/16/2021  1:26 PM          (Please note that portions of this note were completed with a voice recognition program.  Efforts were made to edit the dictations but occasionally words are mis-transcribed.)    The patient was given an opportunity to see the Emergency Attending. The patient voiced understanding that I was a Mid-LevelProvider and was in agreement with being seen independently by myself. Provider:  I personally performed the services described in the documentation, reviewed and edited the documentation which was dictated to the scribe in my presence, and it accurately records my words and actions.     LATRICIA Montes CNP, 2/16/21, 11:18 AM       LATRICIA Montes CNP  02/18/21 2914

## 2021-04-24 ENCOUNTER — HOSPITAL ENCOUNTER (EMERGENCY)
Age: 26
Discharge: HOME | End: 2021-04-24
Attending: FAMILY MEDICINE
Payer: COMMERCIAL

## 2021-04-24 VITALS
HEART RATE: 93 BPM | RESPIRATION RATE: 16 BRPM | SYSTOLIC BLOOD PRESSURE: 122 MMHG | TEMPERATURE: 98.1 F | OXYGEN SATURATION: 99 % | DIASTOLIC BLOOD PRESSURE: 88 MMHG

## 2021-04-24 DIAGNOSIS — R00.2 PALPITATIONS: Primary | ICD-10-CM

## 2021-04-24 LAB
ANION GAP SERPL CALCULATED.3IONS-SCNC: 11 MEQ/L (ref 8–16)
BASOPHILS # BLD: 0.2 %
BASOPHILS ABSOLUTE: 0 THOU/MM3 (ref 0–0.1)
BUN BLDV-MCNC: 9 MG/DL (ref 7–22)
CALCIUM SERPL-MCNC: 9 MG/DL (ref 8.5–10.5)
CHLORIDE BLD-SCNC: 101 MEQ/L (ref 98–111)
CO2: 22 MEQ/L (ref 23–33)
CREAT SERPL-MCNC: 0.5 MG/DL (ref 0.4–1.2)
EKG ATRIAL RATE: 86 BPM
EKG P AXIS: 35 DEGREES
EKG P-R INTERVAL: 148 MS
EKG Q-T INTERVAL: 364 MS
EKG QRS DURATION: 72 MS
EKG QTC CALCULATION (BAZETT): 435 MS
EKG R AXIS: 22 DEGREES
EKG T AXIS: 21 DEGREES
EKG VENTRICULAR RATE: 86 BPM
EOSINOPHIL # BLD: 0.5 %
EOSINOPHILS ABSOLUTE: 0.1 THOU/MM3 (ref 0–0.4)
ERYTHROCYTE [DISTWIDTH] IN BLOOD BY AUTOMATED COUNT: 12.2 % (ref 11.5–14.5)
ERYTHROCYTE [DISTWIDTH] IN BLOOD BY AUTOMATED COUNT: 38.9 FL (ref 35–45)
GFR SERPL CREATININE-BSD FRML MDRD: > 90 ML/MIN/1.73M2
GLUCOSE BLD-MCNC: 89 MG/DL (ref 70–108)
HCT VFR BLD CALC: 38.3 % (ref 37–47)
HEMOGLOBIN: 13.1 GM/DL (ref 12–16)
IMMATURE GRANS (ABS): 0.06 THOU/MM3 (ref 0–0.07)
IMMATURE GRANULOCYTES: 0.6 %
LYMPHOCYTES # BLD: 17 %
LYMPHOCYTES ABSOLUTE: 1.8 THOU/MM3 (ref 1–4.8)
MCH RBC QN AUTO: 30 PG (ref 26–33)
MCHC RBC AUTO-ENTMCNC: 34.2 GM/DL (ref 32.2–35.5)
MCV RBC AUTO: 87.6 FL (ref 81–99)
MONOCYTES # BLD: 6.2 %
MONOCYTES ABSOLUTE: 0.6 THOU/MM3 (ref 0.4–1.3)
NUCLEATED RED BLOOD CELLS: 0 /100 WBC
OSMOLALITY CALCULATION: 266.4 MOSMOL/KG (ref 275–300)
PLATELET # BLD: 222 THOU/MM3 (ref 130–400)
PMV BLD AUTO: 9.5 FL (ref 9.4–12.4)
POTASSIUM REFLEX MAGNESIUM: 3.6 MEQ/L (ref 3.5–5.2)
RBC # BLD: 4.37 MILL/MM3 (ref 4.2–5.4)
SEG NEUTROPHILS: 75.5 %
SEGMENTED NEUTROPHILS ABSOLUTE COUNT: 7.8 THOU/MM3 (ref 1.8–7.7)
SODIUM BLD-SCNC: 134 MEQ/L (ref 135–145)
WBC # BLD: 10.3 THOU/MM3 (ref 4.8–10.8)

## 2021-04-24 PROCEDURE — 99284 EMERGENCY DEPT VISIT MOD MDM: CPT

## 2021-04-24 PROCEDURE — 2580000003 HC RX 258: Performed by: FAMILY MEDICINE

## 2021-04-24 PROCEDURE — 80048 BASIC METABOLIC PNL TOTAL CA: CPT

## 2021-04-24 PROCEDURE — 93005 ELECTROCARDIOGRAM TRACING: CPT | Performed by: FAMILY MEDICINE

## 2021-04-24 PROCEDURE — 93010 ELECTROCARDIOGRAM REPORT: CPT | Performed by: INTERNAL MEDICINE

## 2021-04-24 PROCEDURE — 85025 COMPLETE CBC W/AUTO DIFF WBC: CPT

## 2021-04-24 PROCEDURE — 36415 COLL VENOUS BLD VENIPUNCTURE: CPT

## 2021-04-24 PROCEDURE — 96360 HYDRATION IV INFUSION INIT: CPT

## 2021-04-24 RX ORDER — METOCLOPRAMIDE 10 MG/1
TABLET ORAL
COMMUNITY
Start: 2021-02-11 | End: 2021-09-08 | Stop reason: ALTCHOICE

## 2021-04-24 RX ORDER — 0.9 % SODIUM CHLORIDE 0.9 %
1000 INTRAVENOUS SOLUTION INTRAVENOUS ONCE
Status: COMPLETED | OUTPATIENT
Start: 2021-04-24 | End: 2021-04-24

## 2021-04-24 RX ADMIN — SODIUM CHLORIDE 1000 ML: 9 INJECTION, SOLUTION INTRAVENOUS at 17:42

## 2021-04-24 NOTE — ED TRIAGE NOTES
Pt presents to ER with tachycardia. Pt states she is 16 weeks pregnant and drank 2 cups of coffee and a partial red bull. She thinks she drank too much caffeine causing her tachycardia. Pt denies pain.

## 2021-04-24 NOTE — ED PROVIDER NOTES
Result Value    Segs Absolute 7.8 (*)     All other components within normal limits   BASIC METABOLIC PANEL W/ REFLEX TO MG FOR LOW K - Abnormal; Notable for the following components:    Sodium 134 (*)     CO2 22 (*)     All other components within normal limits   OSMOLALITY - Abnormal; Notable for the following components:    Osmolality Calc 266.4 (*)     All other components within normal limits   ANION GAP   GLOMERULAR FILTRATION RATE, ESTIMATED       ED COURSE & MEDICAL DECISION MAKING   Pertinent Labs & Imaging studies reviewed and interpreted. (See chart for details)  See chart for details of medications given during the ED stay. Vitals:    04/24/21 1743   BP: 122/88   Pulse: 93   Resp: 16   Temp:    SpO2: 99%       Differential Diagnosis: Thyroid storm, Sepsis, Acute Coronary Syndrome, Congestive Heart Failure, Pulmonary Embolus, Tachyarrhythmias such as SVT, rapid A. Fib, ventricular tachycardia, other. FINAL IMPRESSION   1. Palpitations         Plan: MDM pt possibly may have tachycardia due to caffeine consumption, but also due to potential dehydration or hypovolemia due to her pregnancy, though she has not been complaining of intractable vomiting. Re-assessment at [facility] - pt doing much better, dc home now with anticipatory guidance.     Electronically signed by: Carter uF, 4/24/2021  6:20 PM    (This note was completed with a voice recognition program)        Lauri Cano MD  04/24/21 8312

## 2021-05-11 ENCOUNTER — HOSPITAL ENCOUNTER (OUTPATIENT)
Dept: NON INVASIVE DIAGNOSTICS | Age: 26
Discharge: HOME | End: 2021-05-11
Payer: COMMERCIAL

## 2021-05-11 DIAGNOSIS — R00.0 TACHYCARDIA: ICD-10-CM

## 2021-05-11 PROCEDURE — 93226 XTRNL ECG REC<48 HR SCAN A/R: CPT

## 2021-05-11 PROCEDURE — 93225 XTRNL ECG REC<48 HRS REC: CPT

## 2021-05-19 LAB
ACQUISITION DURATION: NORMAL S
AVERAGE HEART RATE: 96 BPM
HOOKUP DATE: NORMAL
HOOKUP TIME: NORMAL
MAX HEART RATE TIME/DATE: NORMAL
MAX HEART RATE: 141 BPM
MIN HEART RATE TIME/DATE: NORMAL
MIN HEART RATE: 70 BPM
NUMBER OF QRS COMPLEXES: NORMAL
NUMBER OF SUPRAVENTRICULAR COUPLETS: 0
NUMBER OF SUPRAVENTRICULAR ECTOPICS: 0
NUMBER OF SUPRAVENTRICULAR ISOLATED BEATS: 0
NUMBER OF VENTRICULAR BIGEMINAL CYCLES: 0
NUMBER OF VENTRICULAR COUPLETS: 0
NUMBER OF VENTRICULAR ECTOPICS: 0

## 2021-06-20 ENCOUNTER — HOSPITAL ENCOUNTER (EMERGENCY)
Age: 26
Discharge: HOME | End: 2021-06-20
Payer: COMMERCIAL

## 2021-06-20 VITALS
RESPIRATION RATE: 14 BRPM | WEIGHT: 185 LBS | BODY MASS INDEX: 31.58 KG/M2 | HEART RATE: 98 BPM | DIASTOLIC BLOOD PRESSURE: 87 MMHG | TEMPERATURE: 97.6 F | OXYGEN SATURATION: 99 % | HEIGHT: 64 IN | SYSTOLIC BLOOD PRESSURE: 124 MMHG

## 2021-06-20 DIAGNOSIS — R22.0 SWELLING OF LEFT SIDE OF FACE: Primary | ICD-10-CM

## 2021-06-20 PROCEDURE — 99283 EMERGENCY DEPT VISIT LOW MDM: CPT

## 2021-06-20 PROCEDURE — 6370000000 HC RX 637 (ALT 250 FOR IP): Performed by: PHYSICIAN ASSISTANT

## 2021-06-20 RX ORDER — PENICILLIN V POTASSIUM 250 MG/1
500 TABLET ORAL ONCE
Status: COMPLETED | OUTPATIENT
Start: 2021-06-20 | End: 2021-06-20

## 2021-06-20 RX ORDER — PENICILLIN V POTASSIUM 500 MG/1
500 TABLET ORAL 4 TIMES DAILY
Qty: 28 TABLET | Refills: 0 | Status: SHIPPED | OUTPATIENT
Start: 2021-06-20 | End: 2021-06-27

## 2021-06-20 RX ORDER — ACETAMINOPHEN 500 MG
1000 TABLET ORAL ONCE
Status: COMPLETED | OUTPATIENT
Start: 2021-06-20 | End: 2021-06-20

## 2021-06-20 RX ADMIN — ACETAMINOPHEN 1000 MG: 500 TABLET ORAL at 10:11

## 2021-06-20 RX ADMIN — PENICILLIN V POTASIUM 500 MG: 250 TABLET ORAL at 10:11

## 2021-06-20 ASSESSMENT — ENCOUNTER SYMPTOMS
BACK PAIN: 0
VOMITING: 0
SORE THROAT: 0
COLOR CHANGE: 0
EYE DISCHARGE: 0
EYE PAIN: 0
EYE ITCHING: 0
COUGH: 0
DIARRHEA: 0
NAUSEA: 0
ABDOMINAL PAIN: 0
RHINORRHEA: 0
WHEEZING: 0
SHORTNESS OF BREATH: 0

## 2021-06-20 ASSESSMENT — PAIN DESCRIPTION - PAIN TYPE: TYPE: ACUTE PAIN

## 2021-06-20 ASSESSMENT — PAIN DESCRIPTION - DESCRIPTORS: DESCRIPTORS: PRESSURE

## 2021-06-20 ASSESSMENT — PAIN DESCRIPTION - LOCATION: LOCATION: EYE;MOUTH

## 2021-06-20 ASSESSMENT — PAIN SCALES - GENERAL
PAINLEVEL_OUTOF10: 2
PAINLEVEL_OUTOF10: 2

## 2021-06-20 ASSESSMENT — PAIN DESCRIPTION - ORIENTATION: ORIENTATION: LEFT

## 2021-06-20 ASSESSMENT — PAIN DESCRIPTION - FREQUENCY: FREQUENCY: CONTINUOUS

## 2021-06-20 NOTE — ED TRIAGE NOTES
Patient presents to ER with complaints of left upper mouth pain and left eye pressure that started this morning at 0400. Pain 2 on a scale of 0-10, described as pressure.

## 2021-06-20 NOTE — ED PROVIDER NOTES
[facility] 65 22 COMPLAINT       Chief Complaint   Patient presents with    Dental Pain     left upper    Eye Problem     left eye pressure       Nurses Notes reviewed and I agree except as notedin the HPI. HISTORY OF PRESENT ILLNESS    Leonor Schneider is a 23 y.o. female who presents complains of  pressure left upper jaw is been going on for about 4 AM.  She states she has pressure that is pressure radiates into her eye. She states that she has had no fever or chills patient is had no nausea or vomiting. The patient is 24 weeks pregnant. She is not have any issues. Location/Symptom: left upper jaw pressure  Timing/Onset: 0400 am  Context/Setting: home  Quality: ache  Duration: constant  Modifying Factors: none  Severity: 4    REVIEW OF SYSTEMS     Review of Systems   Constitutional: Negative for activity change, appetite change, chills and fever. HENT: Negative for congestion, ear pain, rhinorrhea and sore throat. Left upper jaw pressure     Eyes: Negative for pain, discharge and itching. Respiratory: Negative for cough, shortness of breath and wheezing. Cardiovascular: Negative for chest pain. Gastrointestinal: Negative for abdominal pain, diarrhea, nausea and vomiting. Genitourinary: Negative for difficulty urinating and dysuria. Musculoskeletal: Negative for arthralgias, back pain and myalgias. Skin: Negative for color change and rash. Neurological: Negative for dizziness, seizures, light-headedness and headaches. Psychiatric/Behavioral: Negative for agitation, confusion, self-injury and suicidal ideas. All other systems reviewed and are negative. PAST MEDICAL HISTORY    has no past medical history on file. SURGICAL HISTORY      has no past surgical history on file.     CURRENT MEDICATIONS       Discharge Medication List as of 6/20/2021 10:56 AM      CONTINUE these medications which have NOT CHANGED Details   metoclopramide (REGLAN) 10 MG tablet take 1 tablet by mouth four times a day 30 MINUTES before meals and at bedtimeHistorical Med      famotidine (PEPCID) 20 MG tablet Take 1 tablet by mouth 2 times daily for 5 days, Disp-10 tablet, R-0Print      ondansetron (ZOFRAN) 4 MG tablet Take 4 mg by mouth every 8 hours as needed for Nausea or VomitingHistorical Med             ALLERGIES     is allergic to keflex [cephalexin]. HISTORY     has no family status information on file. family history is not on file. SOCIALHISTORY      reports that she has never smoked. She has never used smokeless tobacco. She reports that she does not drink alcohol and does not use drugs. PHYSICAL EXAM     INITIAL VITALS:  height is 5' 4\" (1.626 m) and weight is 185 lb (83.9 kg). Her oral temperature is 97.6 °F (36.4 °C). Her blood pressure is 124/87 and her pulse is 98. Her respiration is 14 and oxygen saturation is 99%. Physical Exam  Vitals and nursing note reviewed. Constitutional:       Appearance: She is well-developed. HENT:      Head: Normocephalic and atraumatic. Comments: Mild soft tissue swelling along the gumline on the left and some mild facial swelling no trismus. There is no redness. EOMs were intact. Eyes:      Pupils: Pupils are equal, round, and reactive to light. Cardiovascular:      Rate and Rhythm: Tachycardia present. Pulmonary:      Effort: Pulmonary effort is normal. No respiratory distress. Abdominal:      General: There is no distension. Palpations: Abdomen is soft. Musculoskeletal:         General: Normal range of motion. Cervical back: Normal range of motion and neck supple. Skin:     General: Skin is warm and dry. Neurological:      Mental Status: She is alert and oriented to person, place, and time. Psychiatric:         Behavior: Behavior normal.         DIFFERENTIAL DIAGNOSIS:   Left-sided facial swelling suspect dental pathology.     She does have some tachycardia but she has had this throughout her whole pregnancy. She has been to cardiology and had a monitor done and was told everything was fine she is also nervous to I will give her her first dose of her medication and monitor her heart rate here to see if it trends down. Isabell Meek DIAGNOSTIC RESULTS     EKG: All EKG's are interpreted by the Emergency Department Physician who either signs or Co-signs this chart in the absence of a cardiologist.      RADIOLOGY: non-plain film images(s) such as CT, Ultrasound and MRI are read by the radiologist.  None      LABS:   Labs Reviewed - No data to display    EMERGENCY DEPARTMENT COURSE:   :    Vitals:    06/20/21 0951 06/20/21 1055   BP: 124/87    Pulse: 112 98   Resp: 14    Temp: 97.6 °F (36.4 °C)    TempSrc: Oral    SpO2: 99%    Weight: 185 lb (83.9 kg)    Height: 5' 4\" (1.626 m)      Patient was seen history physical exam was performed. Patient was given Tylenol and Pen-Vee K . Patient's heart rate was repeated and it came down to 98 after this. Patient will be discharged home. The patient's fetal heart tones were normal.  See disposition below    CRITICAL CARE:  None    CONSULTS:  None    PROCEDURES:  None    FINAL IMPRESSION      1.  Swelling of left side of face          DISPOSITION/PLAN   Discharge    PATIENT REFERRED TO:  Asaf Scott  [address] [place] [address]  [phone]    In 1 day  and your DDS      DISCHARGE MEDICATIONS:  Discharge Medication List as of 6/20/2021 10:56 AM      START taking these medications    Details   penicillin v potassium (VEETID) 500 MG tablet Take 1 tablet by mouth 4 times daily for 7 days, Disp-28 tablet, R-0Print             (Please note that portions of this note were completed with a voice recognitionprogram.  Efforts were made to edit the dictations but occasionally words are mis-transcribed.)    BROOKS [place]  06/20/21 7313

## 2021-08-01 ENCOUNTER — HOSPITAL ENCOUNTER (EMERGENCY)
Age: 26
Discharge: HOME | End: 2021-08-01
Payer: COMMERCIAL

## 2021-08-01 VITALS
OXYGEN SATURATION: 97 % | DIASTOLIC BLOOD PRESSURE: 70 MMHG | SYSTOLIC BLOOD PRESSURE: 98 MMHG | TEMPERATURE: 98.2 F | WEIGHT: 193 LBS | BODY MASS INDEX: 32.95 KG/M2 | HEIGHT: 64 IN | HEART RATE: 96 BPM | RESPIRATION RATE: 16 BRPM

## 2021-08-01 DIAGNOSIS — R06.02 SHORTNESS OF BREATH: Primary | ICD-10-CM

## 2021-08-01 DIAGNOSIS — R07.89 SENSATION OF CHEST PRESSURE: ICD-10-CM

## 2021-08-01 DIAGNOSIS — Z3A.30 PREGNANCY WITH 30 COMPLETED WEEKS GESTATION: ICD-10-CM

## 2021-08-01 LAB
ALBUMIN SERPL-MCNC: 3.6 G/DL (ref 3.5–5.1)
ALP BLD-CCNC: 87 U/L (ref 38–126)
ALT SERPL-CCNC: 7 U/L (ref 11–66)
ANION GAP SERPL CALCULATED.3IONS-SCNC: 11 MEQ/L (ref 8–16)
AST SERPL-CCNC: 10 U/L (ref 5–40)
BASOPHILS # BLD: 0.3 %
BASOPHILS ABSOLUTE: 0 THOU/MM3 (ref 0–0.1)
BILIRUB SERPL-MCNC: 0.2 MG/DL (ref 0.3–1.2)
BILIRUBIN URINE: NEGATIVE
BLOOD, URINE: NEGATIVE
BUN BLDV-MCNC: 8 MG/DL (ref 7–22)
CALCIUM SERPL-MCNC: 8.8 MG/DL (ref 8.5–10.5)
CHARACTER, URINE: CLEAR
CHLORIDE BLD-SCNC: 105 MEQ/L (ref 98–111)
CO2: 21 MEQ/L (ref 23–33)
COLOR: YELLOW
CREAT SERPL-MCNC: 0.5 MG/DL (ref 0.4–1.2)
EKG ATRIAL RATE: 95 BPM
EKG P AXIS: 39 DEGREES
EKG P-R INTERVAL: 142 MS
EKG Q-T INTERVAL: 350 MS
EKG QRS DURATION: 70 MS
EKG QTC CALCULATION (BAZETT): 439 MS
EKG R AXIS: 32 DEGREES
EKG T AXIS: 15 DEGREES
EKG VENTRICULAR RATE: 95 BPM
EOSINOPHIL # BLD: 1 %
EOSINOPHILS ABSOLUTE: 0.1 THOU/MM3 (ref 0–0.4)
ERYTHROCYTE [DISTWIDTH] IN BLOOD BY AUTOMATED COUNT: 13.2 % (ref 11.5–14.5)
ERYTHROCYTE [DISTWIDTH] IN BLOOD BY AUTOMATED COUNT: 44.2 FL (ref 35–45)
GFR SERPL CREATININE-BSD FRML MDRD: > 90 ML/MIN/1.73M2
GLUCOSE BLD-MCNC: 88 MG/DL (ref 70–108)
GLUCOSE URINE: NEGATIVE MG/DL
HCT VFR BLD CALC: 35 % (ref 37–47)
HEMOGLOBIN: 11.5 GM/DL (ref 12–16)
IMMATURE GRANS (ABS): 0.13 THOU/MM3 (ref 0–0.07)
IMMATURE GRANULOCYTES: 1.1 %
KETONES, URINE: NEGATIVE
LEUKOCYTE ESTERASE, URINE: NEGATIVE
LYMPHOCYTES # BLD: 21.3 %
LYMPHOCYTES ABSOLUTE: 2.4 THOU/MM3 (ref 1–4.8)
MCH RBC QN AUTO: 30.3 PG (ref 26–33)
MCHC RBC AUTO-ENTMCNC: 32.9 GM/DL (ref 32.2–35.5)
MCV RBC AUTO: 92.3 FL (ref 81–99)
MONOCYTES # BLD: 7.6 %
MONOCYTES ABSOLUTE: 0.9 THOU/MM3 (ref 0.4–1.3)
NITRITE, URINE: NEGATIVE
NUCLEATED RED BLOOD CELLS: 0 /100 WBC
OSMOLALITY CALCULATION: 271.6 MOSMOL/KG (ref 275–300)
PH UA: 6.5 (ref 5–9)
PLATELET # BLD: 224 THOU/MM3 (ref 130–400)
PMV BLD AUTO: 9.3 FL (ref 9.4–12.4)
POTASSIUM SERPL-SCNC: 3.8 MEQ/L (ref 3.5–5.2)
PROTEIN UA: NEGATIVE
RBC # BLD: 3.79 MILL/MM3 (ref 4.2–5.4)
SEG NEUTROPHILS: 68.7 %
SEGMENTED NEUTROPHILS ABSOLUTE COUNT: 7.9 THOU/MM3 (ref 1.8–7.7)
SODIUM BLD-SCNC: 137 MEQ/L (ref 135–145)
SPECIFIC GRAVITY, URINE: 1.01 (ref 1–1.03)
TOTAL PROTEIN: 6 G/DL (ref 6.1–8)
UROBILINOGEN, URINE: 1 EU/DL (ref 0–1)
WBC # BLD: 11.5 THOU/MM3 (ref 4.8–10.8)

## 2021-08-01 PROCEDURE — 59025 FETAL NON-STRESS TEST: CPT

## 2021-08-01 PROCEDURE — 36415 COLL VENOUS BLD VENIPUNCTURE: CPT

## 2021-08-01 PROCEDURE — 85025 COMPLETE CBC W/AUTO DIFF WBC: CPT

## 2021-08-01 PROCEDURE — 80053 COMPREHEN METABOLIC PANEL: CPT

## 2021-08-01 PROCEDURE — 81003 URINALYSIS AUTO W/O SCOPE: CPT

## 2021-08-01 PROCEDURE — 6370000000 HC RX 637 (ALT 250 FOR IP): Performed by: PHYSICIAN ASSISTANT

## 2021-08-01 PROCEDURE — 99284 EMERGENCY DEPT VISIT MOD MDM: CPT

## 2021-08-01 PROCEDURE — 93005 ELECTROCARDIOGRAM TRACING: CPT | Performed by: PHYSICIAN ASSISTANT

## 2021-08-01 RX ADMIN — LIDOCAINE HYDROCHLORIDE: 20 SOLUTION ORAL; TOPICAL at 03:36

## 2021-08-01 ASSESSMENT — PAIN SCALES - GENERAL: PAINLEVEL_OUTOF10: 5

## 2021-08-01 ASSESSMENT — PAIN DESCRIPTION - ORIENTATION: ORIENTATION: MID

## 2021-08-01 ASSESSMENT — ENCOUNTER SYMPTOMS
PHOTOPHOBIA: 0
VOMITING: 0
SHORTNESS OF BREATH: 1
DIARRHEA: 0
COUGH: 0
CHEST TIGHTNESS: 1
RHINORRHEA: 0
ABDOMINAL PAIN: 0
NAUSEA: 0
SORE THROAT: 0

## 2021-08-01 ASSESSMENT — PAIN DESCRIPTION - DESCRIPTORS: DESCRIPTORS: DISCOMFORT

## 2021-08-01 ASSESSMENT — PAIN DESCRIPTION - PAIN TYPE: TYPE: ACUTE PAIN

## 2021-08-01 NOTE — ED TRIAGE NOTES
Pt arrives with complaints of chest pain pt thinks related to GERD. Pt reports sob as well. Pt 30 weeks pregnant. Reports feeling baby move. Pt reports she thinks she eats too fast and too late. Pt currently being treated for GERD. Will monitor.

## 2021-08-01 NOTE — ED PROVIDER NOTES
[address] [address] EMERGENCY DEPT      CHIEF COMPLAINT       Chief Complaint   Patient presents with    Chest Pain     FULLNESS    Gastroesophageal Reflux       Nurses Notes reviewed and I agree except as noted in the HPI. HISTORY OF PRESENT ILLNESS    Laura England is a 23 y.o. female who presents for chest fullness and shortness of breath. Patient is  2 para 1 currently 30 weeks pregnant. She has been having some shortness of breath lately which has been making her anxious. In addition she has been having some chest pressure and was diagnosed with GERD. She was put on Pepcid last week which she has been taking for the past few days. At that time she had pain between her shoulder blades as well. Tonight at 0130 she experienced the fullness in her chest accompanied with shortness of breath again which made her anxious prompting her ER visit. The patient denies chest pain. Patient admits to having bad anxiety which she thinks is contributory. Patient's OB/GYN is Dr. Obie Bradshaw. Patient denies dizziness, fever, chills, URI symptoms, nausea, vomiting, abdominal pain, urinary symptoms, vaginal bleeding, or any other complaints. REVIEW OF SYSTEMS     Review of Systems   Constitutional: Negative for chills, diaphoresis and fever. HENT: Negative for congestion, rhinorrhea and sore throat. Eyes: Negative for photophobia. Respiratory: Positive for chest tightness (\"Fullness\") and shortness of breath. Negative for cough. Cardiovascular: Negative for chest pain. Gastrointestinal: Negative for abdominal pain, diarrhea, nausea and vomiting. Genitourinary: Negative for difficulty urinating, dysuria, frequency and vaginal bleeding. Musculoskeletal: Negative for gait problem. Skin: Negative for rash. Neurological: Negative for dizziness, weakness, light-headedness and numbness. Psychiatric/Behavioral: Negative for confusion. The patient is nervous/anxious.          PAST MEDICAL HISTORY has no past medical history on file. SURGICAL HISTORY      has no past surgical history on file. CURRENT MEDICATIONS       Previous Medications    FAMOTIDINE (PEPCID) 20 MG TABLET    Take 1 tablet by mouth 2 times daily for 5 days    METOCLOPRAMIDE (REGLAN) 10 MG TABLET    take 1 tablet by mouth four times a day 30 MINUTES before meals and at bedtime    ONDANSETRON (ZOFRAN) 4 MG TABLET    Take 4 mg by mouth every 8 hours as needed for Nausea or Vomiting       ALLERGIES     is allergic to keflex [cephalexin]. FAMILY HISTORY     has no family status information on file. family history is not on file. SOCIAL HISTORY    reports that she has never smoked. She has never used smokeless tobacco. She reports that she does not drink alcohol and does not use drugs. PHYSICAL EXAM     INITIAL VITALS:  height is 5' 4\" (1.626 m) and weight is 193 lb (87.5 kg). Her oral temperature is 98.2 °F (36.8 °C). Her blood pressure is 98/70 and her pulse is 96. Her respiration is 16 and oxygen saturation is 97%. Physical Exam  Constitutional:       Appearance: Normal appearance. She is well-developed. She is not ill-appearing or diaphoretic. HENT:      Head: Normocephalic and atraumatic. Right Ear: Hearing normal.      Left Ear: Hearing normal.      Nose: Nose normal. No rhinorrhea. Mouth/Throat:      Lips: Pink. Mouth: Mucous membranes are moist.      Pharynx: Oropharynx is clear. Eyes:      General: Lids are normal. No scleral icterus. Extraocular Movements: Extraocular movements intact. Conjunctiva/sclera: Conjunctivae normal.      Pupils: Pupils are equal, round, and reactive to light. Neck:      Trachea: Trachea normal.   Cardiovascular:      Rate and Rhythm: Normal rate and regular rhythm. Heart sounds: Normal heart sounds. No murmur heard. Pulmonary:      Effort: Pulmonary effort is normal.      Breath sounds: Normal breath sounds and air entry.  No decreased breath sounds, wheezing or rhonchi. Chest:      Chest wall: Tenderness present. Abdominal:      Palpations: Abdomen is soft. Tenderness: There is no abdominal tenderness. Comments: Gravid abdomen consistent with dates. Fetal heart tones equal 142 bpm   Musculoskeletal:      Cervical back: Normal range of motion and neck supple. No rigidity. Comments: Well perfused; movement normal as observed; no signs of DVT   Lymphadenopathy:      Cervical: No cervical adenopathy. Skin:     General: Skin is warm and dry. Findings: No rash. Neurological:      General: No focal deficit present. Mental Status: She is alert. Sensory: Sensation is intact. Motor: Motor function is intact. Gait: Gait is intact. Psychiatric:         Mood and Affect: Mood normal.         Speech: Speech normal.         Behavior: Behavior is cooperative. DIFFERENTIAL DIAGNOSIS:   Including but not limited to: Discomforts of pregnancy, GERD, anxiety, anemia, less likely as clinically not consistent but considered PE    DIAGNOSTIC RESULTS     EKG: All EKG's are interpreted [facility] who either signs or Co-signs this chart in the absence of a cardiologist.  Ventricular rate 95 bpm  NJ interval 142 ms  QRS duration 70 ms  QTc interval 439 ms  P-R-T axes 39, 32, 15  Normal sinus rhythm. No STEMI    RADIOLOGY: non-plain film images(s) such as CT,Ultrasound and MRI are read by the radiologist.  Plain radiographic images are visualized and preliminarily interpreted by the emergency physician unless otherwise stated below.   No orders to display       LABS:   Labs Reviewed   CBC WITH AUTO DIFFERENTIAL - Abnormal; Notable for the following components:       Result Value    WBC 11.5 (*)     RBC 3.79 (*)     Hemoglobin 11.5 (*)     Hematocrit 35.0 (*)     MPV 9.3 (*)     Segs Absolute 7.9 (*)     Immature Grans (Abs) 0.13 (*)     All other components within normal limits   COMPREHENSIVE METABOLIC PANEL - Abnormal; Notable for the following components:    CO2 21 (*)     Total Protein 6.0 (*)     Total Bilirubin 0.2 (*)     ALT 7 (*)     All other components within normal limits   OSMOLALITY - Abnormal; Notable for the following components:    Osmolality Calc 271.6 (*)     All other components within normal limits   URINE RT REFLEX TO CULTURE   ANION GAP   GLOMERULAR FILTRATION RATE, ESTIMATED       EMERGENCY DEPARTMENT COURSE:   Vitals:    Vitals:    08/01/21 0253 08/01/21 0256 08/01/21 0337 08/01/21 0448   BP: 113/73  99/69 98/70   Pulse: 111  109 96   Resp: 16  18 16   Temp:  98.2 °F (36.8 °C)     TempSrc:  Oral     SpO2: 98%  97% 97%   Weight: 193 lb (87.5 kg)      Height: 5' 4\" (1.626 m)        0454: Consulted Dr. Edmar Loja who was in agreement with discharge home to follow-up in the office as scheduled in 2 days    MDM:  The patient was seen and evaluated within the ED today for chest fullness and dyspnea in the setting of pregnancy. Patient admits symptoms are causing her anxiety. On exam, I appreciated a pleasant 53-year-old female in no distress. Anterior chest was tender. Old records were reviewed. Within the department, I observed the patient's vital signs to be within acceptable range although initially mildly hypertensive. Labor and delivery nurse came down and performed fetal stress test.  Results were reassuring. Laboratory work did not show any acute abnormalities. Within the department, the patient was treated with GI cocktail. I observed the patient's condition to modestly improve during the duration of their stay. I have considered ACS, PE and this patient's presentation is not consistent with such entities and therefore, no further testing was warranted. The patient was comfortable with the plan of discharge home and to follow up with Dr. Edmar Loja. Anticipatory guidance was given. The patient was instructed to return to the emergency department for any worsening of their symptoms. Patient was discharged from the emergency department in good condition with all questions answered. See disposition below. I have given the patient strict written and verbal instructions about care at home, follow-up, and signs and symptoms of worsening of condition and they did verbalize understanding. I discussed this patient with my attending physician, Dr. Martin Hensley, who aided in medical decision making. CRITICAL CARE:   None    CONSULTS:  None    PROCEDURES:  None    FINAL IMPRESSION      1. Shortness of breath    2. Sensation of chest pressure    3. Pregnancy with 30 completed weeks gestation          DISPOSITION/PLAN     1. Shortness of breath    2. Sensation of chest pressure    3.  Pregnancy with 30 completed weeks gestation        PATIENT REFERRED TO:  Aurea Benavides MD  [place] [address]  [phone]      As scheduled in 2 days      DISCHARGE MEDICATIONS:  New Prescriptions    No medications on file       (Please note that portions of this note were completed with a voice recognition program.  Efforts were made to edit the dictations but occasionally words are mis-transcribed.)    Shanon Chandra PA-C 08/01/21 5:18 AM    MARILYN Desai PA-C  08/01/21 4346

## 2021-08-04 NOTE — PROGRESS NOTES
[address] [place] [address]                                NON STRESS TEST    PATIENT NAME: Terry Umana                 :        1995  MED REC NO:   [id number]                           ROOM:       006  ACCOUNT NO:   [de-identified]                           ADMIT DATE: 2021  PROVIDER:     Abner Stokes M.D.    DATE OF STUDY:  2021    INDICATIONS:  This is a 72-year-old,  2, para 1, at 34 and 6,  coming in with complaints of chest pain and shortness of breath. Fetal  heart tones are in the 130s, moderate variability, positive  accelerations. TOCO none. She had a reactive NST.         Mary Vaz M.D.    D: 2021 8:27:07       T: 2021 13:53:22     [email or web address]  Job#: [id number]     Doc#: [id number]    CC:

## 2021-08-21 ENCOUNTER — HOSPITAL ENCOUNTER (EMERGENCY)
Age: 26
Discharge: HOME | End: 2021-08-21
Attending: FAMILY MEDICINE
Payer: COMMERCIAL

## 2021-08-21 ENCOUNTER — APPOINTMENT (OUTPATIENT)
Dept: CT IMAGING | Age: 26
End: 2021-08-21
Payer: COMMERCIAL

## 2021-08-21 VITALS
RESPIRATION RATE: 12 BRPM | HEART RATE: 105 BPM | OXYGEN SATURATION: 98 % | SYSTOLIC BLOOD PRESSURE: 127 MMHG | WEIGHT: 193 LBS | TEMPERATURE: 98.7 F | BODY MASS INDEX: 33.13 KG/M2 | DIASTOLIC BLOOD PRESSURE: 79 MMHG

## 2021-08-21 DIAGNOSIS — K21.9 CHEST PAIN DUE TO GERD: Primary | ICD-10-CM

## 2021-08-21 DIAGNOSIS — R07.9 CHEST PAIN DUE TO GERD: Primary | ICD-10-CM

## 2021-08-21 LAB
ANION GAP SERPL CALCULATED.3IONS-SCNC: 14 MEQ/L (ref 8–16)
BASOPHILS # BLD: 0.2 %
BASOPHILS ABSOLUTE: 0 THOU/MM3 (ref 0–0.1)
BUN BLDV-MCNC: 11 MG/DL (ref 7–22)
CALCIUM SERPL-MCNC: 9.1 MG/DL (ref 8.5–10.5)
CHLORIDE BLD-SCNC: 100 MEQ/L (ref 98–111)
CO2: 20 MEQ/L (ref 23–33)
CREAT SERPL-MCNC: 0.5 MG/DL (ref 0.4–1.2)
D-DIMER QUANTITATIVE: 2054 NG/ML FEU (ref 0–500)
EKG ATRIAL RATE: 135 BPM
EKG P AXIS: 50 DEGREES
EKG P-R INTERVAL: 134 MS
EKG Q-T INTERVAL: 290 MS
EKG QRS DURATION: 64 MS
EKG QTC CALCULATION (BAZETT): 435 MS
EKG R AXIS: 36 DEGREES
EKG T AXIS: 34 DEGREES
EKG VENTRICULAR RATE: 135 BPM
EOSINOPHIL # BLD: 0.3 %
EOSINOPHILS ABSOLUTE: 0 THOU/MM3 (ref 0–0.4)
ERYTHROCYTE [DISTWIDTH] IN BLOOD BY AUTOMATED COUNT: 13.2 % (ref 11.5–14.5)
ERYTHROCYTE [DISTWIDTH] IN BLOOD BY AUTOMATED COUNT: 42.8 FL (ref 35–45)
GFR SERPL CREATININE-BSD FRML MDRD: > 90 ML/MIN/1.73M2
GLUCOSE BLD-MCNC: 81 MG/DL (ref 70–108)
HCT VFR BLD CALC: 37.4 % (ref 37–47)
HEMOGLOBIN: 12.5 GM/DL (ref 12–16)
IMMATURE GRANS (ABS): 0.15 THOU/MM3 (ref 0–0.07)
IMMATURE GRANULOCYTES: 1.3 %
LYMPHOCYTES # BLD: 16.6 %
LYMPHOCYTES ABSOLUTE: 1.9 THOU/MM3 (ref 1–4.8)
MCH RBC QN AUTO: 30.3 PG (ref 26–33)
MCHC RBC AUTO-ENTMCNC: 33.4 GM/DL (ref 32.2–35.5)
MCV RBC AUTO: 90.8 FL (ref 81–99)
MONOCYTES # BLD: 6.8 %
MONOCYTES ABSOLUTE: 0.8 THOU/MM3 (ref 0.4–1.3)
NUCLEATED RED BLOOD CELLS: 0 /100 WBC
PLATELET # BLD: 241 THOU/MM3 (ref 130–400)
PMV BLD AUTO: 9.7 FL (ref 9.4–12.4)
POTASSIUM REFLEX MAGNESIUM: 4.1 MEQ/L (ref 3.5–5.2)
RBC # BLD: 4.12 MILL/MM3 (ref 4.2–5.4)
SEG NEUTROPHILS: 74.8 %
SEGMENTED NEUTROPHILS ABSOLUTE COUNT: 8.8 THOU/MM3 (ref 1.8–7.7)
SODIUM BLD-SCNC: 134 MEQ/L (ref 135–145)
TROPONIN T: < 0.01 NG/ML
WBC # BLD: 11.7 THOU/MM3 (ref 4.8–10.8)

## 2021-08-21 PROCEDURE — 36415 COLL VENOUS BLD VENIPUNCTURE: CPT

## 2021-08-21 PROCEDURE — 6360000002 HC RX W HCPCS: Performed by: STUDENT IN AN ORGANIZED HEALTH CARE EDUCATION/TRAINING PROGRAM

## 2021-08-21 PROCEDURE — 85025 COMPLETE CBC W/AUTO DIFF WBC: CPT

## 2021-08-21 PROCEDURE — 96374 THER/PROPH/DIAG INJ IV PUSH: CPT

## 2021-08-21 PROCEDURE — 59025 FETAL NON-STRESS TEST: CPT

## 2021-08-21 PROCEDURE — 6370000000 HC RX 637 (ALT 250 FOR IP): Performed by: STUDENT IN AN ORGANIZED HEALTH CARE EDUCATION/TRAINING PROGRAM

## 2021-08-21 PROCEDURE — 99284 EMERGENCY DEPT VISIT MOD MDM: CPT

## 2021-08-21 PROCEDURE — 93010 ELECTROCARDIOGRAM REPORT: CPT | Performed by: INTERNAL MEDICINE

## 2021-08-21 PROCEDURE — 71275 CT ANGIOGRAPHY CHEST: CPT

## 2021-08-21 PROCEDURE — 85379 FIBRIN DEGRADATION QUANT: CPT

## 2021-08-21 PROCEDURE — 96361 HYDRATE IV INFUSION ADD-ON: CPT

## 2021-08-21 PROCEDURE — 2580000003 HC RX 258: Performed by: STUDENT IN AN ORGANIZED HEALTH CARE EDUCATION/TRAINING PROGRAM

## 2021-08-21 PROCEDURE — 80048 BASIC METABOLIC PNL TOTAL CA: CPT

## 2021-08-21 PROCEDURE — 6360000004 HC RX CONTRAST MEDICATION: Performed by: STUDENT IN AN ORGANIZED HEALTH CARE EDUCATION/TRAINING PROGRAM

## 2021-08-21 PROCEDURE — 84484 ASSAY OF TROPONIN QUANT: CPT

## 2021-08-21 PROCEDURE — 93005 ELECTROCARDIOGRAM TRACING: CPT | Performed by: EMERGENCY MEDICINE

## 2021-08-21 RX ORDER — ONDANSETRON 2 MG/ML
4 INJECTION INTRAMUSCULAR; INTRAVENOUS ONCE
Status: COMPLETED | OUTPATIENT
Start: 2021-08-21 | End: 2021-08-21

## 2021-08-21 RX ORDER — PANTOPRAZOLE SODIUM 40 MG/1
40 GRANULE, DELAYED RELEASE ORAL
COMMUNITY
End: 2021-09-08 | Stop reason: ALTCHOICE

## 2021-08-21 RX ORDER — 0.9 % SODIUM CHLORIDE 0.9 %
1000 INTRAVENOUS SOLUTION INTRAVENOUS ONCE
Status: COMPLETED | OUTPATIENT
Start: 2021-08-21 | End: 2021-08-21

## 2021-08-21 RX ADMIN — ONDANSETRON 4 MG: 2 INJECTION INTRAMUSCULAR; INTRAVENOUS at 12:48

## 2021-08-21 RX ADMIN — SODIUM CHLORIDE 1000 ML: 9 INJECTION, SOLUTION INTRAVENOUS at 12:48

## 2021-08-21 RX ADMIN — LIDOCAINE HYDROCHLORIDE: 20 SOLUTION ORAL; TOPICAL at 12:49

## 2021-08-21 RX ADMIN — IOPAMIDOL 80 ML: 755 INJECTION, SOLUTION INTRAVENOUS at 14:11

## 2021-08-21 ASSESSMENT — ENCOUNTER SYMPTOMS
RESPIRATORY NEGATIVE: 1
GASTROINTESTINAL NEGATIVE: 1

## 2021-08-21 NOTE — ED NOTES
Patient is resting in bed with easy and unlabored respirations. Call light in reach. Side rails up x2. Patient denies further complaints or concerns. Will monitor.         Angie Norton RN  08/21/21 7475

## 2021-08-21 NOTE — FLOWSHEET NOTE
ER notified TERENCE RN of arrival of patient with complaints of chest discomfort/pressure and dizziness. Patient 32w5d . Dr. Wesley Vo aware of patients arrival and instructed staff to keep patient in ED for evaluation of her chest discomfort. NST per order received by Dr. Wesley Vo. TERENCE RN at bedside at this time to perform NST. Patient denies bleeding or leaking of fluid. Denies abdominal tenderness. Denies cramping. EFM applied.

## 2021-08-21 NOTE — ED PROVIDER NOTES
[address] [place] [address]          Pt Name: Mitch Bence  MRN: [id number]  [place] 1995  Date of evaluation: 8/21/2021  Treating Resident Physician: Eleazar Preciado MD  Supervising Physician: Sulema Carroll MD    CHIEF COMPLAINT       Chief Complaint   Patient presents with    Chest Pain     History obtained from the patient. HISTORY OF PRESENT ILLNESS    HPI  Mitch Bence is a 23 y.o. female, 33 weeks pregnancy who presents to the emergency department for evaluation of chest pain that in 2 days ago described as pressure/burning, retrosternal radiated between his shoulders, comes and goes, 3/10 in intensity, associated with one episode of nausea and vomiting. Patient has had this symptoms before with a previous diagnosis of GERD. Patient also describes shortness of breath associated with chest pain. Patient denies headache, fever, no tinnitus no blurry vision. No lightheadedness, no fainting, no leg pain no palpitations. . Vaginal bleeding no discharge patient refers she has been feeling baby movements  dentnote  The patient has no other acute complaints at this time. REVIEW OF SYSTEMS   Review of Systems   Constitutional: Negative. HENT: Negative. Respiratory: Negative. Cardiovascular: Negative. Gastrointestinal: Negative. Genitourinary: Negative. Musculoskeletal: Negative. Neurological: Negative. Psychiatric/Behavioral: Negative. PAST MEDICAL AND SURGICAL HISTORY   History reviewed. No pertinent past medical history. History reviewed. No pertinent surgical history. MEDICATIONS   No current facility-administered medications for this encounter.     Current Outpatient Medications:     pantoprazole sodium (PROTONIX) 40 MG PACK packet, Take 40 mg by mouth every morning (before breakfast), Disp: , Rfl:     metoclopramide (REGLAN) 10 MG tablet, take 1 tablet by mouth four times a day 30 Normal breath sounds. No stridor. No wheezing, rhonchi or rales. Chest:      Chest wall: No tenderness. Abdominal:      General: Bowel sounds are normal. There is no distension. Palpations: Abdomen is soft. Tenderness: There is no abdominal tenderness. Musculoskeletal:      Cervical back: Normal range of motion. Right lower leg: Edema (Mild bilateral is no tenderness in calf) present. Left lower leg: Edema present. Skin:     General: Skin is warm. Capillary Refill: Capillary refill takes less than 2 seconds. Neurological:      General: No focal deficit present. Mental Status: She is alert and oriented to person, place, and time. MEDICAL DECISION MAKING   Initial Assessment:   3 23year-old patient, 33-week gestation, complaining of chest pain/shortness of breath. With previous medical history of GERD stating that this pain is similar to previous episodes of reflux, however initial EKG tachycardia and Q waves in III, patient has a low risk for ambulatory pulmonary syndrome as well has myocardial infarction we will rule it out previous diagnosis as well has we will administer symptomatic medication for GERD. 2. Initial diagnosis  a. Pulmonary embolism low risk [place] 1  b. Gastroesophageal reflux. Plan:    Zofran 4 mg, GI cocktail   D-dimer, CBC, BMP, troponin   Reassessment.         ED RESULTS   Laboratory results:  Labs Reviewed   CBC WITH AUTO DIFFERENTIAL - Abnormal; Notable for the following components:       Result Value    WBC 11.7 (*)     RBC 4.12 (*)     Segs Absolute 8.8 (*)     Immature Grans (Abs) 0.15 (*)     All other components within normal limits   BASIC METABOLIC PANEL W/ REFLEX TO MG FOR LOW K - Abnormal; Notable for the following components:    Sodium 134 (*)     CO2 20 (*)     All other components within normal limits   D-DIMER, QUANTITATIVE - Abnormal; Notable for the following components:    D-Dimer, Quant 2054.00 (*)     All other components within normal limits   TROPONIN   ANION GAP   GLOMERULAR FILTRATION RATE, ESTIMATED       Radiologic studies results:  CTA Chest W WO Contrast   Final Result   1. No pulmonary embolism. 2. No acute intrathoracic findings. Final report electronically signed by Dr. Joanna Heller on 8/21/2021 2:23 PM          ED Medications administered this visit:   Medications   0.9 % sodium chloride bolus (0 mLs Intravenous Stopped 8/21/21 1527)   aluminum & magnesium hydroxide-simethicone (MAALOX) 30 mL, lidocaine viscous hcl (XYLOCAINE) 5 mL (GI COCKTAIL) ( Oral Given 8/21/21 1249)   ondansetron (ZOFRAN) injection 4 mg (4 mg Intravenous Given 8/21/21 1248)   iopamidol (ISOVUE-370) 76 % injection 80 mL (80 mLs Intravenous Given 8/21/21 1411)         ED COURSE     ED Course as of Aug 21 1541   Sat Aug 21, 2021   1513 Negative   Troponin:    Troponin T < 0.010 [JR]   1513 Elevated   D-Dimer, Quantitative(!):    D-Dimer, Quant 2054.00(!) [JR]   1513 Mild hyponatremia   Basic Metabolic Panel w/ Reflex to MG(!):    Sodium 134(!)   Potassium 4.1   Chloride 100   CO2 20(!)   Glucose 81   BUN 11   Creatinine 0.5   Calcium 9.1 [JR]   1513 Normal   CBC Auto Differential(!):    WBC 11.7(!)   RBC 4.12(!)   Hemoglobin Quant 12.5   Hematocrit 37.4   MCV 90.8   MCH 30.3   MCHC 33.4   RDW-CV 13.2   RDW-SD 42.8   Platelet Count 964   MPV 9.7   Seg Neutrophils 74.8   Lymphocytes 16.6   Monocytes 6.8   Eosinophils 0.3   Basophils 0.2   Immature Granulocytes 1.3   Segs Absolute 8.8(!)   Lymphocytes Absolute 1.9   Monocytes Absolute 0.8   Eosinophils Absolute 0.0   Basophils Absolute 0.0   Immature Grans (Abs) 0.15(!)   Nucleated Red Blood Cells 0 [JR]   1513 Tachycardia   EKG 12 Lead [JR]   1514 No pulmonary embolism. 2. No acute intrathoracic findings. [JR]      ED Course User Index  [JR] Kwame Farr MD     Reassessment  37year-old patient complaining of chest pain and shortness of breath.   With previous medical history of GERD. However EKG shows tachycardia and Q waves in III. Patient had a low risk for pulmonary embolism however D-dimer was elevated that is why we asked for CT PE chest with contrast.  CT chest came negative for pulmonary embolism, troponin was negative, no electrolyte imbalance currently patient tolerating oral intake with symptomatic control. Patient will be discharged with follow-up with OB in 2 days, primary care physician in 2 days, alarm signs and recommendations. Patient agrees and understands. MEDICATION CHANGES     Discharge Medication List as of 8/21/2021  3:17 PM            FINAL DISPOSITION     Final diagnoses:   Chest pain due to GERD     Condition: condition: good  Dispo: Discharge to home      This transcription was electronically signed. Parts of this transcriptions may have been dictated by use of voice recognition software and electronically transcribed, and parts may have been transcribed with the assistance of an ED scribe. The transcription may contain errors not detected in proofreading. Please refer to my supervising physician's documentation if my documentation differs.     Electronically Signed: Lolita Bardales MD, 08/21/21, 3:41 PM         Lolita Bardales MD  Resident  08/21/21 9103

## 2021-09-08 ENCOUNTER — OFFICE VISIT (OUTPATIENT)
Dept: FAMILY MEDICINE CLINIC | Age: 26
End: 2021-09-08
Payer: COMMERCIAL

## 2021-09-08 VITALS
HEIGHT: 64 IN | WEIGHT: 196.6 LBS | OXYGEN SATURATION: 98 % | DIASTOLIC BLOOD PRESSURE: 64 MMHG | RESPIRATION RATE: 16 BRPM | BODY MASS INDEX: 33.57 KG/M2 | SYSTOLIC BLOOD PRESSURE: 98 MMHG | HEART RATE: 130 BPM | TEMPERATURE: 98.6 F

## 2021-09-08 DIAGNOSIS — F41.1 GAD (GENERALIZED ANXIETY DISORDER): ICD-10-CM

## 2021-09-08 DIAGNOSIS — Z00.00 ANNUAL PHYSICAL EXAM: Primary | ICD-10-CM

## 2021-09-08 PROCEDURE — 99385 PREV VISIT NEW AGE 18-39: CPT | Performed by: NURSE PRACTITIONER

## 2021-09-08 RX ORDER — PANTOPRAZOLE SODIUM 20 MG/1
1 TABLET, DELAYED RELEASE ORAL DAILY
COMMUNITY
Start: 2021-08-20 | End: 2022-03-31

## 2021-09-08 RX ORDER — SERTRALINE HYDROCHLORIDE 25 MG/1
25 TABLET, FILM COATED ORAL DAILY
Qty: 30 TABLET | Refills: 1 | Status: ON HOLD | OUTPATIENT
Start: 2021-09-08 | End: 2021-09-28 | Stop reason: ALTCHOICE

## 2021-09-08 SDOH — ECONOMIC STABILITY: TRANSPORTATION INSECURITY
IN THE PAST 12 MONTHS, HAS LACK OF TRANSPORTATION KEPT YOU FROM MEETINGS, WORK, OR FROM GETTING THINGS NEEDED FOR DAILY LIVING?: NO

## 2021-09-08 SDOH — ECONOMIC STABILITY: TRANSPORTATION INSECURITY
IN THE PAST 12 MONTHS, HAS THE LACK OF TRANSPORTATION KEPT YOU FROM MEDICAL APPOINTMENTS OR FROM GETTING MEDICATIONS?: NO

## 2021-09-08 SDOH — ECONOMIC STABILITY: FOOD INSECURITY: WITHIN THE PAST 12 MONTHS, THE FOOD YOU BOUGHT JUST DIDN'T LAST AND YOU DIDN'T HAVE MONEY TO GET MORE.: NEVER TRUE

## 2021-09-08 SDOH — ECONOMIC STABILITY: FOOD INSECURITY: WITHIN THE PAST 12 MONTHS, YOU WORRIED THAT YOUR FOOD WOULD RUN OUT BEFORE YOU GOT MONEY TO BUY MORE.: NEVER TRUE

## 2021-09-08 ASSESSMENT — SOCIAL DETERMINANTS OF HEALTH (SDOH): HOW HARD IS IT FOR YOU TO PAY FOR THE VERY BASICS LIKE FOOD, HOUSING, MEDICAL CARE, AND HEATING?: NOT HARD AT ALL

## 2021-09-08 ASSESSMENT — PATIENT HEALTH QUESTIONNAIRE - PHQ9
SUM OF ALL RESPONSES TO PHQ9 QUESTIONS 1 & 2: 0
1. LITTLE INTEREST OR PLEASURE IN DOING THINGS: 0
2. FEELING DOWN, DEPRESSED OR HOPELESS: 0
SUM OF ALL RESPONSES TO PHQ QUESTIONS 1-9: 0

## 2021-09-08 NOTE — PROGRESS NOTES
[address] [facility] [address]  Dept: [phone]  Dept Fax: [phone]    Visit type: Established patient    Reason for Visit: Establish Care (New Patient- Previous  Patient) and Dizziness (seeing some black spots- OBGYN wants to see PCP, might not be pregnancy related)         Assessment and Plan       1. Annual physical exam  2. THONY (generalized anxiety disorder)  -     sertraline (ZOLOFT) 25 MG tablet; Take 1 tablet by mouth daily, Disp-30 tablet, R-1Normal      Return in about 6 weeks (around 10/20/2021) for fu med start . Subjective       Brandan, 35 weeks    HAs been a couple of years     Anxiety  Onset years ago  Was talking to [facility], has been cuasing problems   Is trying to do things on her own  Is trying to side track her mine  Is overwhelmed about everything  This pregnancy has been worse   Was given celexa         Is baby sitting, but stopped last week and this helped     Diet- has varied diet,     Acid reflex- is taking protonix       Dental - up to date    Eye- has glasses. Has been seeing black spots in over a month. Has not been dizziness. HAs tried to increase water        Review of Systems   Constitutional: Negative for activity change, appetite change and fever. HENT: Negative for congestion, ear pain and rhinorrhea. Eyes: Negative for discharge and visual disturbance. Respiratory: Negative for cough, chest tightness and shortness of breath. Cardiovascular: Negative for chest pain and palpitations. Gastrointestinal: Negative for abdominal pain, constipation, diarrhea and vomiting. Genitourinary: Negative for difficulty urinating and hematuria. Musculoskeletal: Negative for arthralgias and myalgias. Skin: Negative for rash. Neurological: Negative for dizziness, weakness, numbness and headaches.    Psychiatric/Behavioral: Positive for agitation, behavioral problems, decreased concentration and sleep disturbance. Negative for self-injury and suicidal ideas. The patient is nervous/anxious and is hyperactive. Allergies   Allergen Reactions    Keflex [Cephalexin]      Years ago. States someone told her she was allergic to it       Outpatient Medications Prior to Visit   Medication Sig Dispense Refill    pantoprazole (PROTONIX) 20 MG tablet Take 1 tablet by mouth daily      pantoprazole sodium (PROTONIX) 40 MG PACK packet Take 40 mg by mouth every morning (before breakfast)      metoclopramide (REGLAN) 10 MG tablet take 1 tablet by mouth four times a day 30 MINUTES before meals and at bedtime (Patient not taking: Reported on 9/8/2021)      famotidine (PEPCID) 20 MG tablet Take 1 tablet by mouth 2 times daily for 5 days 10 tablet 0    ondansetron (ZOFRAN) 4 MG tablet Take 4 mg by mouth every 8 hours as needed for Nausea or Vomiting (Patient not taking: Reported on 9/8/2021)       No facility-administered medications prior to visit. Past Medical History:   Diagnosis Date    Anxiety         Social History     Tobacco Use    Smoking status: Never Smoker    Smokeless tobacco: Never Used   Substance Use Topics    Alcohol use: No        History reviewed. No pertinent surgical history. History reviewed. No pertinent family history. Objective       BP 98/64 (Site: Right Upper Arm, Position: Sitting, Cuff Size: Large Adult) Comment: manual  Pulse 130   Temp 98.6 °F (37 °C) (Temporal)   Resp 16   Ht 5' 4\" (1.626 m)   Wt 196 lb 9.6 oz (89.2 kg)   LMP 12/30/2020   SpO2 98%   BMI 33.75 kg/m²   Physical Exam  Vitals reviewed. Constitutional:       Appearance: She is well-developed. HENT:      Head: Normocephalic and atraumatic. Right Ear: External ear normal.      Left Ear: External ear normal.   Eyes:      Conjunctiva/sclera: Conjunctivae normal.   Neck:      Thyroid: No thyromegaly.       Trachea: Trachea normal.   Cardiovascular:      Rate and Rhythm: Normal rate and regular rhythm. Heart sounds: Normal heart sounds. No murmur heard. No friction rub. No gallop. Pulmonary:      Effort: Pulmonary effort is normal.      Breath sounds: Normal breath sounds. Abdominal:      General: Bowel sounds are normal.      Tenderness: There is no abdominal tenderness. Comments: pregnant   Musculoskeletal:         General: Normal range of motion. Cervical back: Normal range of motion and neck supple. No spinous process tenderness. Skin:     General: Skin is warm and dry. Findings: No erythema or rash. Neurological:      Mental Status: She is alert and oriented to person, place, and time. Psychiatric:         Speech: Speech normal.         Behavior: Behavior normal.         Thought Content:  Thought content normal.           Data Reviewed and Summarized       Labs:     Imaging/Testing:        Rose Mclain, LATRICIA - CNP

## 2021-09-14 ENCOUNTER — NURSE ONLY (OUTPATIENT)
Dept: FAMILY MEDICINE CLINIC | Age: 26
End: 2021-09-14
Payer: COMMERCIAL

## 2021-09-14 DIAGNOSIS — Z23 NEED FOR TDAP VACCINATION: Primary | ICD-10-CM

## 2021-09-14 PROCEDURE — 90471 IMMUNIZATION ADMIN: CPT | Performed by: NURSE PRACTITIONER

## 2021-09-14 PROCEDURE — 90715 TDAP VACCINE 7 YRS/> IM: CPT | Performed by: NURSE PRACTITIONER

## 2021-09-14 NOTE — PROGRESS NOTES
After obtaining consent, and per orders of zIaiah Bishop CNP, injection of 0.5mL IM Boostrix given in Left deltoid by Krupa Henson LPN. Patient instructed to remain in clinic for 20 minutes afterwards, and to report any adverse reaction to me immediately. Immunizations Administered     Name Date Dose Route    Tdap (Boostrix, Adacel) 9/14/2021 0.5 mL Intramuscular    Site: Deltoid- Left    Lot: [id number]    NDC: 55661-089-38        Patient tolerated well.

## 2021-09-15 ASSESSMENT — ENCOUNTER SYMPTOMS
SHORTNESS OF BREATH: 0
CHEST TIGHTNESS: 0
CONSTIPATION: 0
ABDOMINAL PAIN: 0
COUGH: 0
RHINORRHEA: 0
VOMITING: 0
DIARRHEA: 0
EYE DISCHARGE: 0

## 2021-09-19 NOTE — PROGRESS NOTES
[address] [place] [address]                                NON STRESS TEST    PATIENT NAME: Gricel Landaverde                 :        1995  MED REC NO:   [id number]                           ROOM:       572  ACCOUNT NO:   [de-identified]                           ADMIT DATE: 2021  PROVIDER:     Andreas Griffin M.D.    DATE OF STUDY:  2021    INDICATIONS:  This is a 82-year-old  2, para 1, at 28 and 5,  coming in with complaints of chest pain. Fetal heart tones are in the  140s, moderate variability, positive accelerations. TOCO none. She had  a reactive NST.         Liliana Mckeon M.D.    D: 2021 17:07:10       T: 2021 23:24:42     ARMAND_WILMER  Job#: [id number]     Doc#: [id number]    CC:

## 2021-09-29 PROBLEM — O47.9 UTERINE CONTRACTIONS DURING PREGNANCY: Status: ACTIVE | Noted: 2021-09-29

## 2021-10-07 ENCOUNTER — APPOINTMENT (OUTPATIENT)
Dept: LABOR AND DELIVERY | Age: 26
End: 2021-10-07
Payer: COMMERCIAL

## 2021-10-07 ENCOUNTER — ANESTHESIA (OUTPATIENT)
Dept: LABOR AND DELIVERY | Age: 26
End: 2021-10-07
Payer: COMMERCIAL

## 2021-10-07 ENCOUNTER — ANESTHESIA (OUTPATIENT)
Dept: OPERATING ROOM | Age: 26
End: 2021-10-07

## 2021-10-07 ENCOUNTER — HOSPITAL ENCOUNTER (INPATIENT)
Age: 26
LOS: 2 days | Discharge: HOME | End: 2021-10-09
Attending: OBSTETRICS & GYNECOLOGY | Admitting: OBSTETRICS & GYNECOLOGY
Payer: COMMERCIAL

## 2021-10-07 ENCOUNTER — ANESTHESIA EVENT (OUTPATIENT)
Dept: OPERATING ROOM | Age: 26
End: 2021-10-07

## 2021-10-07 ENCOUNTER — ANESTHESIA EVENT (OUTPATIENT)
Dept: LABOR AND DELIVERY | Age: 26
End: 2021-10-07
Payer: COMMERCIAL

## 2021-10-07 LAB
ABO: NORMAL
AMPHETAMINE+METHAMPHETAMINE URINE SCREEN: NEGATIVE
ANTIBODY SCREEN: NORMAL
BARBITURATE QUANTITATIVE URINE: NEGATIVE
BENZODIAZEPINE QUANTITATIVE URINE: NEGATIVE
CANNABINOID QUANTITATIVE URINE: NEGATIVE
COCAINE METABOLITE QUANTITATIVE URINE: NEGATIVE
ERYTHROCYTE [DISTWIDTH] IN BLOOD BY AUTOMATED COUNT: 14.1 % (ref 11.5–14.5)
ERYTHROCYTE [DISTWIDTH] IN BLOOD BY AUTOMATED COUNT: 45.1 FL (ref 35–45)
HCT VFR BLD CALC: 35.1 % (ref 37–47)
HEMOGLOBIN: 11.5 GM/DL (ref 12–16)
MCH RBC QN AUTO: 29 PG (ref 26–33)
MCHC RBC AUTO-ENTMCNC: 32.8 GM/DL (ref 32.2–35.5)
MCV RBC AUTO: 88.6 FL (ref 81–99)
OPIATES, URINE: NEGATIVE
OXYCODONE: NEGATIVE
PHENCYCLIDINE QUANTITATIVE URINE: NEGATIVE
PLATELET # BLD: 200 THOU/MM3 (ref 130–400)
PMV BLD AUTO: 10 FL (ref 9.4–12.4)
RBC # BLD: 3.96 MILL/MM3 (ref 4.2–5.4)
RH FACTOR: NORMAL
RPR: NONREACTIVE
WBC # BLD: 13 THOU/MM3 (ref 4.8–10.8)

## 2021-10-07 PROCEDURE — 3E033VJ INTRODUCTION OF OTHER HORMONE INTO PERIPHERAL VEIN, PERCUTANEOUS APPROACH: ICD-10-PCS | Performed by: OBSTETRICS & GYNECOLOGY

## 2021-10-07 PROCEDURE — 80307 DRUG TEST PRSMV CHEM ANLYZR: CPT

## 2021-10-07 PROCEDURE — 6360000002 HC RX W HCPCS: Performed by: NURSE ANESTHETIST, CERTIFIED REGISTERED

## 2021-10-07 PROCEDURE — 36415 COLL VENOUS BLD VENIPUNCTURE: CPT

## 2021-10-07 PROCEDURE — 85027 COMPLETE CBC AUTOMATED: CPT

## 2021-10-07 PROCEDURE — 86592 SYPHILIS TEST NON-TREP QUAL: CPT

## 2021-10-07 PROCEDURE — 10907ZC DRAINAGE OF AMNIOTIC FLUID, THERAPEUTIC FROM PRODUCTS OF CONCEPTION, VIA NATURAL OR ARTIFICIAL OPENING: ICD-10-PCS | Performed by: OBSTETRICS & GYNECOLOGY

## 2021-10-07 PROCEDURE — 86850 RBC ANTIBODY SCREEN: CPT

## 2021-10-07 PROCEDURE — 3700000025 EPIDURAL BLOCK: Performed by: ANESTHESIOLOGY

## 2021-10-07 PROCEDURE — 6370000000 HC RX 637 (ALT 250 FOR IP): Performed by: OBSTETRICS & GYNECOLOGY

## 2021-10-07 PROCEDURE — 7200000001 HC VAGINAL DELIVERY

## 2021-10-07 PROCEDURE — 6360000002 HC RX W HCPCS

## 2021-10-07 PROCEDURE — 2500000003 HC RX 250 WO HCPCS: Performed by: ANESTHESIOLOGY

## 2021-10-07 PROCEDURE — 0KQM0ZZ REPAIR PERINEUM MUSCLE, OPEN APPROACH: ICD-10-PCS | Performed by: OBSTETRICS & GYNECOLOGY

## 2021-10-07 PROCEDURE — 1220000000 HC SEMI PRIVATE OB R&B

## 2021-10-07 PROCEDURE — 2580000003 HC RX 258: Performed by: OBSTETRICS & GYNECOLOGY

## 2021-10-07 PROCEDURE — 86901 BLOOD TYPING SEROLOGIC RH(D): CPT

## 2021-10-07 PROCEDURE — 86900 BLOOD TYPING SEROLOGIC ABO: CPT

## 2021-10-07 RX ORDER — IBUPROFEN 800 MG/1
800 TABLET ORAL EVERY 8 HOURS SCHEDULED
Status: DISCONTINUED | OUTPATIENT
Start: 2021-10-07 | End: 2021-10-09 | Stop reason: HOSPADM

## 2021-10-07 RX ORDER — ACETAMINOPHEN 325 MG/1
650 TABLET ORAL EVERY 4 HOURS PRN
Status: DISCONTINUED | OUTPATIENT
Start: 2021-10-07 | End: 2021-10-09 | Stop reason: HOSPADM

## 2021-10-07 RX ORDER — NALOXONE HYDROCHLORIDE 0.4 MG/ML
0.4 INJECTION, SOLUTION INTRAMUSCULAR; INTRAVENOUS; SUBCUTANEOUS PRN
Status: DISCONTINUED | OUTPATIENT
Start: 2021-10-07 | End: 2021-10-07

## 2021-10-07 RX ORDER — BUTORPHANOL TARTRATE 1 MG/ML
1 INJECTION, SOLUTION INTRAMUSCULAR; INTRAVENOUS
Status: DISCONTINUED | OUTPATIENT
Start: 2021-10-07 | End: 2021-10-07

## 2021-10-07 RX ORDER — IBUPROFEN 800 MG/1
800 TABLET ORAL EVERY 8 HOURS PRN
Status: DISCONTINUED | OUTPATIENT
Start: 2021-10-07 | End: 2021-10-07

## 2021-10-07 RX ORDER — DIPHENHYDRAMINE HYDROCHLORIDE 50 MG/ML
25 INJECTION INTRAMUSCULAR; INTRAVENOUS EVERY 4 HOURS PRN
Status: DISCONTINUED | OUTPATIENT
Start: 2021-10-07 | End: 2021-10-07

## 2021-10-07 RX ORDER — SODIUM CHLORIDE, SODIUM LACTATE, POTASSIUM CHLORIDE, CALCIUM CHLORIDE 600; 310; 30; 20 MG/100ML; MG/100ML; MG/100ML; MG/100ML
INJECTION, SOLUTION INTRAVENOUS CONTINUOUS
Status: DISCONTINUED | OUTPATIENT
Start: 2021-10-07 | End: 2021-10-07

## 2021-10-07 RX ORDER — MISOPROSTOL 200 UG/1
1000 TABLET ORAL PRN
Status: DISCONTINUED | OUTPATIENT
Start: 2021-10-07 | End: 2021-10-07

## 2021-10-07 RX ORDER — CARBOPROST TROMETHAMINE 250 UG/ML
250 INJECTION, SOLUTION INTRAMUSCULAR PRN
Status: DISCONTINUED | OUTPATIENT
Start: 2021-10-07 | End: 2021-10-07

## 2021-10-07 RX ORDER — ROPIVACAINE HYDROCHLORIDE 2 MG/ML
INJECTION, SOLUTION EPIDURAL; INFILTRATION; PERINEURAL
Status: COMPLETED
Start: 2021-10-07 | End: 2021-10-07

## 2021-10-07 RX ORDER — DOCUSATE SODIUM 100 MG/1
100 CAPSULE, LIQUID FILLED ORAL 2 TIMES DAILY PRN
Status: DISCONTINUED | OUTPATIENT
Start: 2021-10-07 | End: 2021-10-07

## 2021-10-07 RX ORDER — METHYLERGONOVINE MALEATE 0.2 MG/ML
200 INJECTION INTRAVENOUS PRN
Status: DISCONTINUED | OUTPATIENT
Start: 2021-10-07 | End: 2021-10-07

## 2021-10-07 RX ORDER — ONDANSETRON 2 MG/ML
8 INJECTION INTRAMUSCULAR; INTRAVENOUS EVERY 6 HOURS PRN
Status: DISCONTINUED | OUTPATIENT
Start: 2021-10-07 | End: 2021-10-07

## 2021-10-07 RX ORDER — ACETAMINOPHEN 325 MG/1
650 TABLET ORAL EVERY 4 HOURS PRN
Status: DISCONTINUED | OUTPATIENT
Start: 2021-10-07 | End: 2021-10-07

## 2021-10-07 RX ORDER — LIDOCAINE HYDROCHLORIDE 10 MG/ML
30 INJECTION, SOLUTION EPIDURAL; INFILTRATION; INTRACAUDAL; PERINEURAL PRN
Status: DISCONTINUED | OUTPATIENT
Start: 2021-10-07 | End: 2021-10-07

## 2021-10-07 RX ORDER — ONDANSETRON 2 MG/ML
4 INJECTION INTRAMUSCULAR; INTRAVENOUS EVERY 6 HOURS PRN
Status: DISCONTINUED | OUTPATIENT
Start: 2021-10-07 | End: 2021-10-07

## 2021-10-07 RX ORDER — ONDANSETRON 4 MG/1
8 TABLET, ORALLY DISINTEGRATING ORAL EVERY 8 HOURS PRN
Status: DISCONTINUED | OUTPATIENT
Start: 2021-10-07 | End: 2021-10-09 | Stop reason: HOSPADM

## 2021-10-07 RX ORDER — HYDROCODONE BITARTRATE AND ACETAMINOPHEN 5; 325 MG/1; MG/1
1 TABLET ORAL EVERY 4 HOURS PRN
Status: DISCONTINUED | OUTPATIENT
Start: 2021-10-07 | End: 2021-10-09 | Stop reason: HOSPADM

## 2021-10-07 RX ORDER — SODIUM CHLORIDE 9 MG/ML
25 INJECTION, SOLUTION INTRAVENOUS PRN
Status: DISCONTINUED | OUTPATIENT
Start: 2021-10-07 | End: 2021-10-09 | Stop reason: HOSPADM

## 2021-10-07 RX ORDER — ROPIVACAINE HYDROCHLORIDE 2 MG/ML
INJECTION, SOLUTION EPIDURAL; INFILTRATION; PERINEURAL PRN
Status: DISCONTINUED | OUTPATIENT
Start: 2021-10-07 | End: 2021-10-07 | Stop reason: SDUPTHER

## 2021-10-07 RX ORDER — SEVOFLURANE 250 ML/250ML
1 LIQUID RESPIRATORY (INHALATION) CONTINUOUS PRN
Status: DISCONTINUED | OUTPATIENT
Start: 2021-10-07 | End: 2021-10-07

## 2021-10-07 RX ORDER — SODIUM CHLORIDE, SODIUM LACTATE, POTASSIUM CHLORIDE, CALCIUM CHLORIDE 600; 310; 30; 20 MG/100ML; MG/100ML; MG/100ML; MG/100ML
INJECTION, SOLUTION INTRAVENOUS CONTINUOUS
Status: DISCONTINUED | OUTPATIENT
Start: 2021-10-07 | End: 2021-10-09 | Stop reason: HOSPADM

## 2021-10-07 RX ORDER — BISACODYL 10 MG
10 SUPPOSITORY, RECTAL RECTAL DAILY PRN
Status: DISCONTINUED | OUTPATIENT
Start: 2021-10-07 | End: 2021-10-09 | Stop reason: HOSPADM

## 2021-10-07 RX ORDER — FERROUS SULFATE 325(65) MG
325 TABLET ORAL
Status: DISCONTINUED | OUTPATIENT
Start: 2021-10-08 | End: 2021-10-09 | Stop reason: HOSPADM

## 2021-10-07 RX ORDER — MODIFIED LANOLIN
OINTMENT (GRAM) TOPICAL PRN
Status: DISCONTINUED | OUTPATIENT
Start: 2021-10-07 | End: 2021-10-09 | Stop reason: HOSPADM

## 2021-10-07 RX ORDER — SODIUM CHLORIDE 0.9 % (FLUSH) 0.9 %
10 SYRINGE (ML) INJECTION PRN
Status: DISCONTINUED | OUTPATIENT
Start: 2021-10-07 | End: 2021-10-09 | Stop reason: HOSPADM

## 2021-10-07 RX ORDER — SODIUM CHLORIDE, SODIUM LACTATE, POTASSIUM CHLORIDE, AND CALCIUM CHLORIDE .6; .31; .03; .02 G/100ML; G/100ML; G/100ML; G/100ML
500 INJECTION, SOLUTION INTRAVENOUS PRN
Status: DISCONTINUED | OUTPATIENT
Start: 2021-10-07 | End: 2021-10-07

## 2021-10-07 RX ORDER — SODIUM CHLORIDE 0.9 % (FLUSH) 0.9 %
10 SYRINGE (ML) INJECTION EVERY 12 HOURS SCHEDULED
Status: DISCONTINUED | OUTPATIENT
Start: 2021-10-07 | End: 2021-10-09 | Stop reason: HOSPADM

## 2021-10-07 RX ORDER — TERBUTALINE SULFATE 1 MG/ML
0.25 INJECTION, SOLUTION SUBCUTANEOUS ONCE
Status: DISCONTINUED | OUTPATIENT
Start: 2021-10-07 | End: 2021-10-07

## 2021-10-07 RX ORDER — NALBUPHINE HCL 10 MG/ML
5 AMPUL (ML) INJECTION EVERY 4 HOURS PRN
Status: DISCONTINUED | OUTPATIENT
Start: 2021-10-07 | End: 2021-10-07

## 2021-10-07 RX ORDER — DOCUSATE SODIUM 100 MG/1
100 CAPSULE, LIQUID FILLED ORAL 2 TIMES DAILY PRN
Status: DISCONTINUED | OUTPATIENT
Start: 2021-10-07 | End: 2021-10-09 | Stop reason: HOSPADM

## 2021-10-07 RX ORDER — SODIUM CHLORIDE, SODIUM LACTATE, POTASSIUM CHLORIDE, AND CALCIUM CHLORIDE .6; .31; .03; .02 G/100ML; G/100ML; G/100ML; G/100ML
1000 INJECTION, SOLUTION INTRAVENOUS PRN
Status: DISCONTINUED | OUTPATIENT
Start: 2021-10-07 | End: 2021-10-07

## 2021-10-07 RX ADMIN — BENZOCAINE AND LEVOMENTHOL: 200; 5 SPRAY TOPICAL at 15:07

## 2021-10-07 RX ADMIN — Medication 18 ML/HR: at 10:03

## 2021-10-07 RX ADMIN — IBUPROFEN 800 MG: 800 TABLET, FILM COATED ORAL at 15:07

## 2021-10-07 RX ADMIN — SODIUM CHLORIDE, POTASSIUM CHLORIDE, SODIUM LACTATE AND CALCIUM CHLORIDE: 600; 310; 30; 20 INJECTION, SOLUTION INTRAVENOUS at 06:41

## 2021-10-07 RX ADMIN — SODIUM CHLORIDE, POTASSIUM CHLORIDE, SODIUM LACTATE AND CALCIUM CHLORIDE: 600; 310; 30; 20 INJECTION, SOLUTION INTRAVENOUS at 05:43

## 2021-10-07 RX ADMIN — SODIUM CHLORIDE, POTASSIUM CHLORIDE, SODIUM LACTATE AND CALCIUM CHLORIDE: 600; 310; 30; 20 INJECTION, SOLUTION INTRAVENOUS at 09:49

## 2021-10-07 RX ADMIN — Medication 87.3 MILLI-UNITS/MIN: at 14:45

## 2021-10-07 RX ADMIN — SODIUM CHLORIDE, POTASSIUM CHLORIDE, SODIUM LACTATE AND CALCIUM CHLORIDE: 600; 310; 30; 20 INJECTION, SOLUTION INTRAVENOUS at 14:11

## 2021-10-07 RX ADMIN — ACETAMINOPHEN 650 MG: 325 TABLET ORAL at 21:04

## 2021-10-07 RX ADMIN — Medication 10 MILLI-UNITS/MIN: at 14:34

## 2021-10-07 RX ADMIN — Medication 1 MILLI-UNITS/MIN: at 06:42

## 2021-10-07 RX ADMIN — ROPIVACAINE HYDROCHLORIDE 10 ML: 2 INJECTION, SOLUTION EPIDURAL; INFILTRATION at 10:00

## 2021-10-07 ASSESSMENT — PAIN SCALES - GENERAL
PAINLEVEL_OUTOF10: 1
PAINLEVEL_OUTOF10: 0

## 2021-10-07 ASSESSMENT — PAIN DESCRIPTION - DESCRIPTORS
DESCRIPTORS: ACHING;CRAMPING
DESCRIPTORS: ACHING;CRAMPING

## 2021-10-07 NOTE — PLAN OF CARE
Problem: Anxiety:  Goal: Level of anxiety will decrease  Description: Level of anxiety will decrease  10/7/2021 0845 by Kiana Quintanilla RN  Outcome: Ongoing  calm  Problem: Breathing Pattern - Ineffective:  Goal: Able to breathe comfortably  Description: Able to breathe comfortably  10/7/2021 0845 by Kiana Quintanilla RN  Outcome: Ongoing    Respirations regular and easy  Problem: Fluid Volume - Imbalance:  Goal: Absence of imbalanced fluid volume signs and symptoms  Description: Absence of imbalanced fluid volume signs and symptoms  Outcome: Ongoing   stable  Problem: Fluid Volume - Imbalance:  Goal: Absence of intrapartum hemorrhage signs and symptoms  Description: Absence of intrapartum hemorrhage signs and symptoms  10/7/2021 0845 by Kiana Quintanilla RN  Outcome: Ongoing   stable  Problem: Infection - Intrapartum Infection:  Goal: Will show no infection signs and symptoms  Description: Will show no infection signs and symptoms  10/7/2021 0845 by Kiana Quintanilla RN  Outcome: Ongoing   afebrile  Problem: Labor Process - Prolonged:  Goal: Uterine contractions within specified parameters  Description: Uterine contractions within specified parameters  10/7/2021 0845 by Kiana Quintanilla RN  Outcome: Ongoing   Irregular contractions  Problem: Pain - Acute:  Goal: Able to cope with pain  Description: Able to cope with pain  10/7/2021 0845 by Kiana Quintanilla RN  Outcome: Ongoing   Pain goal 4, planning labor epidural  Problem: Urinary Retention:  Goal: Experiences of bladder distention will decrease  Description: Experiences of bladder distention will decrease  Outcome: Ongoing   Bathroom priviliges  Problem: Falls - Risk of:  Goal: Will remain free from falls  Description: Will remain free from falls  10/7/2021 0845 by Kiana Quintanilla RN  Outcome: Ongoing   No falls  Problem: Discharge Planning:  Goal: Discharged to appropriate level of care  Description: Discharged to appropriate level of care  10/7/2021 0457 by Paola Briseno, ANNE  Outcome: Ongoing  Note: Pt aware of 2hr recovery period in L&D and then transfer to mom/baby for the remainder of her stay. Pt is agreeable. Problem: Discharge Planning:  Goal: Discharged to appropriate level of care  Description: Discharged to appropriate level of care  10/7/2021 0845 by Isai Sheppard RN  Outcome: Ongoing   Verbalizes understanding of discharge from l/d after 2 hour recovery  Care plan reviewed with patient and cheyenneon. Patient and connon verbalize understanding of the plan of care and contribute to goal setting.

## 2021-10-07 NOTE — ANESTHESIA PRE PROCEDURE
Department of Anesthesiology  Preprocedure Note       Name:  Kolton Jimenez   Age:  27 y. o.  :  1995                                          MRN:  [id number]         Date:  10/7/2021      Surgeon: * No surgeons listed *    Procedure: * No procedures listed *    Medications prior to admission:   Prior to Admission medications    Medication Sig Start Date End Date Taking?  Authorizing Provider   pantoprazole (PROTONIX) 20 MG tablet Take 1 tablet by mouth daily 21  Yes Historical Provider, MD       Current medications:    Current Facility-Administered Medications   Medication Dose Route Frequency Provider Last Rate Last Admin    oxytocin (PITOCIN) 30 units in 500 mL infusion  1 lee-units/min IntraVENous Continuous Melanie Dover MD 6 mL/hr at 10/07/21 0936 6 lee-units/min at 10/07/21 0936    terbutaline (BRETHINE) injection 0.25 mg  0.25 mg SubCUTAneous Once Melanie Dover MD        lactated ringers infusion   IntraVENous Continuous Melanie Dover  mL/hr at 10/07/21 0949 New Bag at 10/07/21 0949    lactated ringers bolus  500 mL IntraVENous PRN Melanie Dover MD        Or    lactated ringers bolus  1,000 mL IntraVENous PRN Melanie Dover MD        lidocaine PF 1 % injection 30 mL  30 mL Other PRN Melanie Dover MD        butorphanol (STADOL) injection 1 mg  1 mg IntraVENous Q2H PRN Melanie Dover MD        nitrous oxide 50% inhalation 1 each  1 each Inhalation Continuous PRN Melanie Dover MD        ondansetron [facility]) injection 8 mg  8 mg IntraVENous Q6H PRN Melanie Dover MD        diphenhydrAMINE (BENADRYL) injection 25 mg  25 mg IntraVENous Q4H PRN Melanie Dover MD        oxytocin (PITOCIN) 30 units in 500 mL infusion  87.3 lee-units/min IntraVENous PRN Melanie Dover MD        And    oxytocin (PITOCIN) 10 unit bolus from the bag  10 Units IntraVENous PRN Melanie Dover MD        methylergonovine (METHERGINE) injection 200 mcg  200 mcg IntraMUSCular Date of last liquid consumption: 10/07/21                        Date of last solid food consumption: 10/06/21    BMI:   Wt Readings from Last 3 Encounters:   10/07/21 202 lb (91.6 kg)   09/28/21 202 lb (91.6 kg)   09/08/21 196 lb 9.6 oz (89.2 kg)     Body mass index is 35.78 kg/m². CBC:   Lab Results   Component Value Date    WBC 13.0 10/07/2021    RBC 3.96 10/07/2021    HGB 11.5 10/07/2021    HCT 35.1 10/07/2021    MCV 88.6 10/07/2021     10/07/2021       CMP:   Lab Results   Component Value Date     08/21/2021    K 4.1 08/21/2021     08/21/2021    CO2 20 08/21/2021    BUN 11 08/21/2021    CREATININE 0.5 08/21/2021    LABGLOM >90 08/21/2021    GLUCOSE 81 08/21/2021    PROT 6.0 08/01/2021    CALCIUM 9.1 08/21/2021    BILITOT 0.2 08/01/2021    ALKPHOS 87 08/01/2021    AST 10 08/01/2021    ALT 7 08/01/2021       POC Tests: No results for input(s): POCGLU, POCNA, POCK, POCCL, POCBUN, POCHEMO, POCHCT in the last 72 hours.     Coags: No results found for: PROTIME, INR, APTT    HCG (If Applicable):   Lab Results   Component Value Date    PREGSERUM POSITIVE 08/12/2019        ABGs: No results found for: PHART, PO2ART, SGD6YXF, BHN7GYO, BEART, U6QPOYCL     Type & Screen (If Applicable):  Lab Results   Component Value Date    LABRH POS 10/07/2021       Drug/Infectious Status (If Applicable):  No results found for: HIV, HEPCAB    COVID-19 Screening (If Applicable): No results found for: COVID19        Anesthesia Evaluation  Patient summary reviewed and Nursing notes reviewed no history of anesthetic complications:   Airway: Mallampati: II  TM distance: >3 FB   Neck ROM: full  Mouth opening: > = 3 FB Dental: normal exam         Pulmonary:Negative Pulmonary ROS and normal exam                               Cardiovascular:Negative CV ROS                      Neuro/Psych:   (+) psychiatric history:            GI/Hepatic/Renal: Neg GI/Hepatic/Renal ROS            Endo/Other: Negative Endo/Other ROS Abdominal:             Vascular: negative vascular ROS. Other Findings:             Anesthesia Plan      epidural     ASA 2             Anesthetic plan and risks discussed with patient.                       LATRICIA Hernández - CRNA   10/7/2021

## 2021-10-07 NOTE — L&D DELIVERY NOTE
Department of Obstetrics and Gynecology  Spontaneous Vaginal Delivery Note      Pt Name: Stu Bazan  MRN: [id number] Paulino #: [de-identified]  YOB: 1995  Procedure Performed By: Aditya Salinas MD, MD        Pre-operative Diagnosis:  Term pregnancy, Induced labor, Single fetus and Uncomplicated pregnancy    Post-operative Diagnosis: Same, delivered. Procedure: spontaneous vaginal delivery    Surgeon:  Hanh Gale    Information for the patient's :  Juana Massed Girl [address] [facility] [[id number]]          Anesthesia:  epidural anesthesia    Estimated blood loss:  106 ml    Complications:  none    Condition:  infant stable to general nursery and mother stable    Delivery Summary:  The patient is a 27 y.o. Rowan Lark at 39w3d who was admitted for induction. She received the following interventions: ARBOW and IV Pitocin induction. The patient progressed well,did receive an epidural, became complete and started to push. She was placed in the dorsal lithotomy position and prepped. She delivered the baby which was then placed on the mother's abdomen. Cord was clamped and cut and infant handed off to the waiting nurse for evaluation. The placenta delivered intact, whole and that the umbilical cord had three vessels noted. The perineum and vagina were explored and a second degree laceration was repaired with Vicryl 3.0. Vaginal sweep was performed at the conclusion of delivery and all needles were taken off the field.  Sponge counts correct      PMH:  Past Medical History:   Diagnosis Date    Anxiety     Postpartum depression     zoloft- plan to get back on after delivered         Aditya Salinas MD

## 2021-10-07 NOTE — FLOWSHEET NOTE
Up to BR with assist to void large amount. Pericare instructions given, voices understanding. Fundus firm , midline, U/U after voiding. Small amount of bleeding noted and one small clot. This was patient's second time up with assistance to the bathroom.

## 2021-10-07 NOTE — H&P
[address] [place] [address]  History and Physical Update    Pt Name: Cheril Dakins  MRN: [id number]  YOB: 1995  Date of evaluation: 10/7/2021    [x] I have examined the patient and reviewed the H&P/Consult and there are no changes to the patient or plans. 19yo  at 36/1 for induction of labor due to term gestation. CE: 3/50/-2. FSE placed without difficulty. Cat 1 tracing. Induction via pitocin. GBS neg. [] I have examined the patient and reviewed the H&P/Consult and have noted the following changes:       Discussion with the patient and/ or family for proposed care, treatment, services; benefits, risks, side effects; likelihood of achieving goals and potential problems that may occur during recuperation was had and all questions were answered. Discussion with the patient and/ or family of reasonable alternatives to the proposed care, treatment, services and the discussion of the risks, benefits, side effects related to the alternatives and the risk related to not receiving the proposed care treatment services was also had and all questions were answered. If this is for an elective surgical procedure then The patient was counseled at length about the risks of jose f Covid-19 during their perioperative period and any recovery window from their procedure. The patient was made aware that jose f Covid-19  may worsen their prognosis for recovering from their procedure  and lend to a higher morbidity and/or mortality risk. All material risks, benefits, and reasonable alternatives including postponing the procedure were discussed. The patient  does wish to proceed with the procedure at this time.              Jai Mayers MD,MD  Electronically signed 10/7/2021 at 8:44 AM

## 2021-10-07 NOTE — PLAN OF CARE
Problem: Anxiety:  Goal: Level of anxiety will decrease  Description: Level of anxiety will decrease  Outcome: Ongoing  Note: Pt calm and cooperative about labor and birth experience. Problem: Breathing Pattern - Ineffective:  Goal: Able to breathe comfortably  Description: Able to breathe comfortably  Outcome: Ongoing  Note: No signs of resp distress noted. Sp02 remains greater than 92% on room air. Respirations equal and unlabored. Problem: Fluid Volume - Imbalance:  Goal: Absence of intrapartum hemorrhage signs and symptoms  Description: Absence of intrapartum hemorrhage signs and symptoms  Outcome: Ongoing  Note: No s/s hemorrhage at this time. Problem: Infection - Intrapartum Infection:  Goal: Will show no infection signs and symptoms  Description: Will show no infection signs and symptoms  Outcome: Ongoing  Note: Vitals stable, pt remains afebrile. GBS negative. FHT's remain reassuring, will continue to monitor. Problem: Labor Process - Prolonged:  Goal: Uterine contractions within specified parameters  Description: Uterine contractions within specified parameters  Outcome: Ongoing  Note: Monitoring contractions via TOCO and palpation. Problem:  Screening:  Goal: Ability to make informed decisions regarding treatment has improved  Description: Ability to make informed decisions regarding treatment has improved  Outcome: Ongoing  Note: Pt able to make her own decisions. Problem: Pain - Acute:  Goal: Able to cope with pain  Description: Able to cope with pain  Outcome: Ongoing  Note: Denies pain currently. Plans for epidural as labor progresses. Problem: Tissue Perfusion - Uteroplacental, Altered:  Goal: Absence of abnormal fetal heart rate pattern  Description: Absence of abnormal fetal heart rate pattern  Outcome: Ongoing  Note: Fetal Heart Tones remain reassuring. Continuous EFM in place.       Problem: Urinary Retention:  Goal: Urinary elimination within specified parameters  Description: Urinary elimination within specified parameters  Outcome: Ongoing  Note: Pt able to ambulate to bathroom to empty bladder without issues. Problem: Falls - Risk of:  Goal: Will remain free from falls  Description: Will remain free from falls  Outcome: Ongoing  Note: Pt. Remains free from falls at this time. RN will continue to provide for a safe environment. Problem: Discharge Planning:  Goal: Discharged to appropriate level of care  Description: Discharged to appropriate level of care  Outcome: Ongoing  Note: Pt aware of 2hr recovery period in L&D and then transfer to mom/baby for the remainder of her stay. Pt is agreeable. Care plan reviewed with patient and FOB Suni. Patient and FOB Gayatrion verbalize understanding of the plan of care and contribute to goal setting.

## 2021-10-07 NOTE — FLOWSHEET NOTE
Text sent to dr Kp Vázquez for epidural request,then pt sitting on edge of bed, pad changed, clear fluid

## 2021-10-07 NOTE — ANESTHESIA PROCEDURE NOTES
Epidural Block    Patient location during procedure: OB  Start time: 10/7/2021 9:45 AM  End time: 10/7/2021 10:02 AM  Reason for block: labor epidural  Staffing  Performed: anesthesiologist and resident/CRNA   Anesthesiologist: Kajal Collins DO  Resident/CRNA: LATRICIA Harrison - CRNA  Preanesthetic Checklist  Completed: patient identified, IV checked, site marked, risks and benefits discussed, surgical consent, monitors and equipment checked, pre-op evaluation, timeout performed, anesthesia consent given, oxygen available and patient being monitored  Epidural  Patient position: sitting  Prep: ChloraPrep  Patient monitoring: continuous pulse ox and frequent blood pressure checks  Approach: midline  Location: lumbar (1-5)  Injection technique: MARCIA saline  Guidance: paresthesia technique  Provider prep: mask and sterile gloves  Needle  Needle type: Tuohy   Needle gauge: 18 G  Needle length: 3.5 in  Needle insertion depth: 8 cm  Catheter type: side hole  Catheter size: 19 G  Catheter at skin depth: 12 cm  Test dose: negative  Assessment  Events: paresthesia  Hemodynamics: stable  Attempts: 1

## 2021-10-07 NOTE — FLOWSHEET NOTE
39w3d patient of Dr. Terese Emanuel presents to L&D for scheduled IOL. Pt notes positive fetal movement. Denies vaginal bleeding or leaking of fluids. Denies regular contractions. Gown given to change into. Patient to bathroom to void, informed of maternal drug testing policy in place on all laboring patients. Verbal consent received, paper consent to be signed and urine to be sent. Explained patients right to have family, representative or physician notified of their admission. Patient has Declined for physician to be notified. Patient has Declined for family/representative to be notified.

## 2021-10-07 NOTE — FLOWSHEET NOTE
Dr Ashley Jett and nursery team here, pt to lithotomy and prepped for delivery, pushing with contraction

## 2021-10-07 NOTE — PLAN OF CARE
Problem: Discharge Planning:  Goal: Discharged to appropriate level of care  Description: Discharged to appropriate level of care  Outcome: Ongoing  Note: Remains in hospital, discussed possible discharge needs. Problem: Constipation:  Goal: Bowel elimination is within specified parameters  Description: Bowel elimination is within specified parameters  Outcome: Ongoing  Note: Taking stool softeners and increasing fiber and fluid in diet. Ambulation encouraged. Problem: Fluid Volume - Imbalance:  Goal: Absence of postpartum hemorrhage signs and symptoms  Description: Absence of postpartum hemorrhage signs and symptoms  Outcome: Ongoing  Note: Vaginal bleeding WNL, Fundus firm and midline, no clots or foul odors. Problem: Infection - Risk of, Puerperal Infection:  Goal: Will show no infection signs and symptoms  Description: Will show no infection signs and symptoms  Outcome: Ongoing  Note: Patient shows no sign/symptoms of infection. Problem: Mood - Altered:  Goal: Mood stable  Description: Mood stable  Outcome: Ongoing  Note: Bonding with baby, participating in infant care. Problem: Pain - Acute:  Goal: Pain level will decrease  Description: Pain level will decrease  Outcome: Ongoing  Note: Pain controlled with po meds. Discussed ice for perineal pain or the use of warm blanket/heating pad for uterine cramps. Pt states her pain goal 4/10 has been met. Care plan reviewed with patient and she contributes to goal setting and voices understanding of plan of care.

## 2021-10-08 LAB — HEMOGLOBIN: 10.3 GM/DL (ref 12–16)

## 2021-10-08 PROCEDURE — 6370000000 HC RX 637 (ALT 250 FOR IP): Performed by: OBSTETRICS & GYNECOLOGY

## 2021-10-08 PROCEDURE — 36415 COLL VENOUS BLD VENIPUNCTURE: CPT

## 2021-10-08 PROCEDURE — 1220000000 HC SEMI PRIVATE OB R&B

## 2021-10-08 PROCEDURE — 85018 HEMOGLOBIN: CPT

## 2021-10-08 RX ORDER — HYDROXYZINE PAMOATE 25 MG/1
25 CAPSULE ORAL EVERY 8 HOURS PRN
Status: DISCONTINUED | OUTPATIENT
Start: 2021-10-08 | End: 2021-10-09 | Stop reason: HOSPADM

## 2021-10-08 RX ADMIN — IBUPROFEN 800 MG: 800 TABLET, FILM COATED ORAL at 05:58

## 2021-10-08 RX ADMIN — HYDROXYZINE PAMOATE 25 MG: 25 CAPSULE ORAL at 18:02

## 2021-10-08 RX ADMIN — ACETAMINOPHEN 650 MG: 325 TABLET ORAL at 22:06

## 2021-10-08 RX ADMIN — IBUPROFEN 800 MG: 800 TABLET, FILM COATED ORAL at 15:47

## 2021-10-08 RX ADMIN — ACETAMINOPHEN 650 MG: 325 TABLET ORAL at 10:28

## 2021-10-08 ASSESSMENT — PAIN SCALES - GENERAL
PAINLEVEL_OUTOF10: 2
PAINLEVEL_OUTOF10: 3
PAINLEVEL_OUTOF10: 3
PAINLEVEL_OUTOF10: 2

## 2021-10-08 NOTE — ANESTHESIA POSTPROCEDURE EVALUATION
Department of Anesthesiology  Postprocedure Note    Patient: Tristan Triana  MRN: [id number]  YOB: 1995  Date of evaluation: 10/8/2021  Time:  7:53 AM     Procedure Summary     Date: 10/07/21 Room / Location:     Anesthesia Start: 0945 Anesthesia Stop: 9053    Procedure: Labor Analgesia Diagnosis:     Scheduled Providers:  Responsible Provider: Jovanny Monson DO    Anesthesia Type: epidural ASA Status: 2          Anesthesia Type: epidural    Emma Phase I:      Emma Phase II: Emma Score: 10    Last vitals: Reviewed and per EMR flowsheets.        Anesthesia Post Evaluation    Patient location during evaluation: floor  Patient participation: complete - patient participated  Level of consciousness: awake  Airway patency: patent  Nausea & Vomiting: no vomiting and no nausea  Complications: no  Cardiovascular status: hemodynamically stable  Respiratory status: acceptable  Hydration status: stable

## 2021-10-08 NOTE — PROGRESS NOTES
Department of Obstetrics and Gynecology  Labor and Delivery  Attending Post Partum Progress Note    PPD #1    SUBJECTIVE: Feeling well. No complaints. OBJECTIVE:     Vitals:  /66   Pulse 69   Temp 97.6 °F (36.4 °C) (Oral)   Resp 16   Ht 5' 3\" (1.6 m)   Wt 202 lb (91.6 kg)   LMP 12/30/2020   SpO2 94%   Breastfeeding Unknown   BMI 35.78 kg/m²     Uterus:  normal size, well involuted, firm, non-tender    DATA:    Hemoglobin:   Lab Results   Component Value Date    HGB 10.3 10/08/2021       ASSESSMENT & PLAN:  Doing well. Home today or tomorrow.     Oral Shukla MD 10/8/2021

## 2021-10-08 NOTE — FLOWSHEET NOTE
In patient's room and patient states that she is having intermittent heart palpitations and shortness of breath. Vitals checked and are stable. Lung sounds are clear throughout. Heart sounds are regular. Patient also states that is could be her anxiety causing her symptoms. Patient states that she had numerous EKG's and heart monitor during her pregnancy. Dr. Alexis Tidwell updated on above. Order received for PRN vistaril.

## 2021-10-08 NOTE — PLAN OF CARE
Problem: Discharge Planning:  Goal: Discharged to appropriate level of care  Description: Discharged to appropriate level of care  10/8/2021 0949 by Aditya Motta RN  Outcome: Ongoing  Note: Remains in hospital, discussed possible discharge needs. Problem: Constipation:  Goal: Bowel elimination is within specified parameters  Description: Bowel elimination is within specified parameters  10/8/2021 0949 by Aditya Motta RN  Outcome: Ongoing  Note: Increasing fiber and fluid in diet. Ambulation encouraged. Refused colace. Problem: Fluid Volume - Imbalance:  Goal: Absence of postpartum hemorrhage signs and symptoms  Description: Absence of postpartum hemorrhage signs and symptoms  10/8/2021 0949 by Aditya Motta RN  Outcome: Ongoing  Note: Vaginal bleeding WNL, Fundus firm and midline, no clots or foul odors. Problem: Infection - Risk of, Puerperal Infection:  Goal: Will show no infection signs and symptoms  Description: Will show no infection signs and symptoms  10/8/2021 0949 by Aditya Motta RN  Outcome: Ongoing  Note: Patient shows no sign/symptoms of infection. Problem: Mood - Altered:  Goal: Mood stable  Description: Mood stable  10/8/2021 0949 by Aditya Motta RN  Outcome: Ongoing  Note: Bonding with baby, participating in infant care. Problem: Pain - Acute:  Goal: Pain level will decrease  Description: Pain level will decrease  10/8/2021 0949 by Aditya Motta RN  Outcome: Ongoing  Note: Pain controlled with po meds. Discussed ice for perineal pain or the use of warm blanket/heating pad for uterine cramps. Pt states her pain goal 4/10 has been met. Problem: Pain:  Goal: Pain level will decrease  Description: Pain level will decrease  10/8/2021 0949 by Aditya Motta RN  Outcome: Ongoing  Note: Pain controlled with po meds. Discussed ice for perineal pain or the use of warm blanket/heating pad for uterine cramps.  Pt states her pain goal 4/10 has been met.      Problem: Pain:  Goal: Control of acute pain  Description: Control of acute pain  Outcome: Ongoing  Note: Pain controlled with po meds. Discussed ice for perineal pain or the use of warm blanket/heating pad for uterine cramps. Pt states her pain goal 4/10 has been met. Problem: Pain:  Goal: Control of chronic pain  Description: Control of chronic pain  Outcome: Completed  Note: Denies chronic pain. Care plan reviewed with patient and she contributes to goal setting and voices understanding of plan of care.

## 2021-10-08 NOTE — PLAN OF CARE
Problem: Constipation:  Goal: Bowel elimination is within specified parameters  Description: Bowel elimination is within specified parameters  10/8/2021 0332 by Hi Cabrera RN  Outcome: Ongoing  Note: Pt passing gas. Problem: Discharge Planning:  Goal: Discharged to appropriate level of care  Description: Discharged to appropriate level of care  10/8/2021 0332 by Hi Cabrera RN  Outcome: Ongoing  Note: Remains in hospital, discussed possible discharge needs. Problem: Fluid Volume - Imbalance:  Goal: Absence of postpartum hemorrhage signs and symptoms  Description: Absence of postpartum hemorrhage signs and symptoms  10/8/2021 0332 by Hi Cabrera RN  Outcome: Ongoing  Note: Small to moderate amt of lochia, no clots. Problem: Infection - Risk of, Puerperal Infection:  Goal: Will show no infection signs and symptoms  Description: Will show no infection signs and symptoms  10/8/2021 0332 by Hi Cabrera RN  Outcome: Ongoing  Note: No clots no lochia noted. Problem: Mood - Altered:  Goal: Mood stable  Description: Mood stable  10/8/2021 0332 by Hi Cabrera RN  Outcome: Ongoing  Note: Bonding with baby, participating in infant care. Problem: Pain - Acute:  Goal: Pain level will decrease  Description: Pain level will decrease  10/8/2021 0332 by Hi Cabrera RN  Outcome: Ongoing  Note: Pain controlled with po meds. Discussed ice for perineal pain and/or incisional pain or the use of warm blanket/heating pad for uterine cramps. Pt states her pain goal 4/10 has been met. Care plan reviewed with patient and she contributes to goal setting and voices understanding of plan of care.

## 2021-10-08 NOTE — DISCHARGE SUMMARY
Vaginal Delivery Discharge Summary    Gestational Age:39w3d    Antepartum complications: none    Admission date: 10/7/2021  4:49 AM      Type of Delivery:      [place] Data  Information for the patient's :  Jorge Kay Girl Ritu Bullock [[id number]]   female   Birth Weight: 6 lb 9.5 oz (2.99 kg)       Labs: CBC   Lab Results   Component Value Date    HGB 10.3 (L) 10/08/2021    HCT 35.1 (L) 10/07/2021        Intrapartum complications: None    Postpartum complications: none    The patient is ambulating well. The patient is tolerating a normal diet. Patient Instructions: Activity: activity as tolerated and no sex for 6 weeks  Diet: regular  Wound Care: as directed    Discharge Information  Current Discharge Medication List    Start celexa as rx'd before when home      CONTINUE these medications which have NOT CHANGED    Details   pantoprazole (PROTONIX) 20 MG tablet Take 1 tablet by mouth daily             No discharge procedures on file.     Condition: Good    Plan:   Follow up in 5 week(s)    Electronically signed by Jay Jay Medina MD on 10/8/2021 at 9:00 AM

## 2021-10-09 VITALS
BODY MASS INDEX: 35.79 KG/M2 | HEIGHT: 63 IN | WEIGHT: 202 LBS | DIASTOLIC BLOOD PRESSURE: 81 MMHG | HEART RATE: 86 BPM | SYSTOLIC BLOOD PRESSURE: 122 MMHG | TEMPERATURE: 99 F | OXYGEN SATURATION: 96 % | RESPIRATION RATE: 16 BRPM

## 2021-10-09 PROCEDURE — 6370000000 HC RX 637 (ALT 250 FOR IP): Performed by: OBSTETRICS & GYNECOLOGY

## 2021-10-09 RX ORDER — CITALOPRAM 20 MG/1
20 TABLET ORAL DAILY
Qty: 30 TABLET | Refills: 3
Start: 2021-10-09 | End: 2022-03-31

## 2021-10-09 RX ADMIN — IBUPROFEN 800 MG: 800 TABLET, FILM COATED ORAL at 07:11

## 2021-10-09 ASSESSMENT — PAIN SCALES - GENERAL: PAINLEVEL_OUTOF10: 4

## 2021-10-09 NOTE — FLOWSHEET NOTE
Post birth warning signs education paper given and reviewed, teaching complete. [place] postpartum depression screening discussed with patient, instructed to contact her healthcare provider if her score is > 10. Patient voiced understanding. Mother's blood type is O+  Mother did not receive Rhogam on. PPD score was 18, patient denies wanting to hurt herself or others. patient stated she has Celexa medication at home that Dr Parminder Loco prescribed toward the end of pregnancy that she has not started yet. Patient spoke with Dr Claritza Hobbs and he advised she start taking the celexa. Gave the patient the PPD scale quiz and advised her to take it in 1 week and in 2 weeks at home and to call OB dr if any concerns arise and spoke with SO regarding what to watch for and to call OB Dr if concerns arise, they both voiced understanding.

## 2021-10-09 NOTE — PLAN OF CARE
Problem: Discharge Planning:  Goal: Discharged to appropriate level of care  Description: Discharged to appropriate level of care  Outcome: Ongoing  Note: Working towards discharge home with family; ducks in a row discussed. Will continue to monitor for needs. Problem: Constipation:  Goal: Bowel elimination is within specified parameters  Description: Bowel elimination is within specified parameters  Outcome: Ongoing  Note:  Encouraged to increase fiber and fluid in diet. Problem: Fluid Volume - Imbalance:  Goal: Absence of postpartum hemorrhage signs and symptoms  Description: Absence of postpartum hemorrhage signs and symptoms  Outcome: Ongoing  Note: Small to scant  amount of rubra lochia noted. Vitals WNL. Problem: Infection - Risk of, Puerperal Infection:  Goal: Will show no infection signs and symptoms  Description: Will show no infection signs and symptoms  Outcome: Ongoing  Note: No signs or symptoms of infection. Vitals WNL. Problem: Mood - Altered:  Goal: Mood stable  Description: Mood stable  Outcome: Ongoing  Note: Calm and cooperative; bonding well with infant. Problem: Pain - Acute:  Goal: Pain level will decrease  Description: Pain level will decrease  Outcome: Ongoing  Note: Pain controlled with po meds. Discussed the use of ice for perineal pain and /or the use of a warm blanket/heating pad for uterine cramps. Patient states her pain goal 4/10 has been met. Care plan reviewed with patient and she contributes to goal setting and voices understanding of plan of care.

## 2021-10-11 ENCOUNTER — APPOINTMENT (OUTPATIENT)
Dept: INTERVENTIONAL RADIOLOGY/VASCULAR | Age: 26
End: 2021-10-11
Payer: COMMERCIAL

## 2021-10-11 ENCOUNTER — HOSPITAL ENCOUNTER (EMERGENCY)
Age: 26
Discharge: HOME | End: 2021-10-11
Attending: EMERGENCY MEDICINE
Payer: COMMERCIAL

## 2021-10-11 VITALS
SYSTOLIC BLOOD PRESSURE: 129 MMHG | OXYGEN SATURATION: 97 % | BODY MASS INDEX: 35 KG/M2 | WEIGHT: 205 LBS | RESPIRATION RATE: 16 BRPM | TEMPERATURE: 98 F | HEIGHT: 64 IN | DIASTOLIC BLOOD PRESSURE: 98 MMHG | HEART RATE: 68 BPM

## 2021-10-11 DIAGNOSIS — M79.605 LEFT LEG PAIN: Primary | ICD-10-CM

## 2021-10-11 PROCEDURE — 99283 EMERGENCY DEPT VISIT LOW MDM: CPT

## 2021-10-11 PROCEDURE — 93971 EXTREMITY STUDY: CPT

## 2021-10-11 ASSESSMENT — ENCOUNTER SYMPTOMS
NAUSEA: 0
ABDOMINAL PAIN: 0
SINUS PRESSURE: 0
VOICE CHANGE: 0
SHORTNESS OF BREATH: 0
RHINORRHEA: 0
WHEEZING: 0
COUGH: 0
BACK PAIN: 0
SORE THROAT: 0
TROUBLE SWALLOWING: 0
DIARRHEA: 0
VOMITING: 0
CHEST TIGHTNESS: 0
CONSTIPATION: 0

## 2021-10-12 NOTE — ED TRIAGE NOTES
Pt to er. Pt states she is 4 days post partum and was sent in by [facility] for leg cramping and tingling lt leg. States started a couple days ago. States sent in to have doppler done.

## 2021-10-12 NOTE — ED PROVIDER NOTES
[address] [address]        Room # 12/012A    CHIEF COMPLAINT    Chief Complaint   Patient presents with    Leg Pain     lt       Nurses Notes reviewed and I agree except as noted in the HPI. HPI    Betzaida Lilly is a 27 y.o. female who presents for evaluation of thigh and calf pain characteristic tightness for the past 2 days. The patient called her OB/GYN and Dr. John Triplett who sent the patient here to be evaluated for blood clot. The patient is 4 days postpartum, term,  without complication. Denies any nausea or vomiting no worsening shortness of breath no chest pain. Denies any injury or trauma      REVIEW OF SYSTEMS    Review of Systems   Constitutional: Negative for appetite change, chills, diaphoresis, fatigue and fever. HENT: Negative for congestion, ear pain, postnasal drip, rhinorrhea, sinus pressure, sneezing, sore throat, trouble swallowing and voice change. Respiratory: Negative for cough, chest tightness, shortness of breath and wheezing. Cardiovascular: Negative for chest pain, palpitations and leg swelling. Gastrointestinal: Negative for abdominal pain, constipation, diarrhea, nausea and vomiting. Musculoskeletal: Negative for arthralgias, back pain, joint swelling, myalgias, neck pain and neck stiffness. Left calf and thigh pain   Neurological: Negative for dizziness, syncope, weakness, light-headedness, numbness and headaches. PAST MEDICAL HISTORY     has a past medical history of Anxiety and Postpartum depression. SURGICAL HISTORY     has a past surgical history that includes Glenn tooth extraction ().     CURRENT MEDICATIONS    Discharge Medication List as of 10/11/2021  9:52 PM      CONTINUE these medications which have NOT CHANGED    Details   citalopram (CELEXA) 20 MG tablet Take 1 tablet by mouth daily, Disp-30 tablet, R-3NO PRINT      pantoprazole (PROTONIX) 20 MG tablet Take 1 tablet by mouth dailyHistorical Med             ALLERGIES    is allergic to keflex [cephalexin]. FAMILY HISTORY    has no family status information on file. family history is not on file. SOCIAL HISTORY     reports that she has never smoked. She has never used smokeless tobacco. She reports that she does not drink alcohol and does not use drugs. PHYSICAL EXAM      INITIAL VITALS: BP (!) 129/98   Pulse 68   Temp 98 °F (36.7 °C) (Oral)   Resp 16   Ht 5' 4\" (1.626 m)   Wt 205 lb (93 kg)   LMP 12/30/2020   SpO2 97%   BMI 35.19 kg/m²  Estimated body mass index is 35.19 kg/m² as calculated from the following:    Height as of this encounter: 5' 4\" (1.626 m). Weight as of this encounter: 205 lb (93 kg). Physical Exam  Vitals reviewed. Constitutional:       Appearance: She is well-developed. HENT:      Head: Normocephalic and atraumatic. Right Ear: External ear normal.      Left Ear: External ear normal.      Nose: Nose normal.   Eyes:      General: No scleral icterus. Conjunctiva/sclera: Conjunctivae normal.      Pupils: Pupils are equal, round, and reactive to light. Neck:      Thyroid: No thyromegaly. Vascular: No JVD. Cardiovascular:      Rate and Rhythm: Normal rate and regular rhythm. Heart sounds: No murmur heard. No friction rub. Pulmonary:      Effort: Pulmonary effort is normal.      Breath sounds: Normal breath sounds. No wheezing or rales. Chest:      Chest wall: No tenderness. Abdominal:      General: Bowel sounds are normal.      Palpations: Abdomen is soft. There is no mass. Tenderness: There is no abdominal tenderness. Musculoskeletal:         General: Tenderness (Mild tightness and tenderness on the posterior thigh, negative Homans' sign in the calf, there is no swelling no joint infection or swelling) present. Cervical back: Normal range of motion and neck supple. Lymphadenopathy:      Cervical: No cervical adenopathy.    Skin: breathing    10:05 PM /98    Controlled Substances Monitoring:        CRITICAL CARE:   None. CONSULTS:  None    PROCEDURES:  None. FINAL IMPRESSION       1. Left leg pain    2. Postpartum state 4 days    3. Postpartum depression          DISPOSITION/PLAN  PATIENT REFERRED TO:  Iván Castillo MD  [place] [address]  [address]  [phone]    Schedule an appointment as soon as possible for a visit in 1 week      [address] [facility] [address] [address] EMERGENCY DEPT  [address]    As needed    DISCHARGEMEDICATIONS:  Discharge Medication List as of 10/11/2021  9:52 PM            (Please note that portions of this note were completed with a voice recognition program and electronically transcribed. Efforts were [facility] edit the dictations but occasionally words are mis-transcribed . The transcription may contain errors not detected in proofreading.   This transcription was electronically signed.)         10/11/21 10:17 PM      No att. providers found      Emergency room physician           Samantha Wright MD  10/11/21 2679

## 2021-10-12 NOTE — ED NOTES
Pt concerned about her BP being elevated. Diastolic BP has consistently been over 100 during ER visit. Pt is 4 days postpartum. Pt also c/o headache and blurred vision. Pt states her BP is usually normal range and she denies having gestational hypertension. Dr Woo oLuis notified and will speak with pt prior to departure.       Rebekah Richmond RN  10/11/21 2200

## 2021-10-15 ENCOUNTER — TELEPHONE (OUTPATIENT)
Dept: FAMILY MEDICINE CLINIC | Age: 26
End: 2021-10-15

## 2021-10-15 NOTE — TELEPHONE ENCOUNTER
----- Message from Mauro Santizo sent at 10/15/2021  9:15 AM EDT -----  Subject: Results Request    QUESTIONS  Which lab or imaging result is the patient calling about? urine test done  Which provider ordered the test?   At what location was the test performed? Date the test was performed? 2021-10-13  Additional Information for Provider? patient called in would like to know   the results of her urine test she had done in the ER please call patient  ---------------------------------------------------------------------------  --------------  [address]  What is the best way for the office to contact you? OK to leave message on   voicemail  Preferred Call Back Phone Number?  [phone]

## 2021-10-15 NOTE — TELEPHONE ENCOUNTER
Urine culture no growth  UA done in hospital was a little off, likely because of post delivery.    If she is still having issues, have her follow up Monday   Or if symptoms worsen would need to go to urgent care over weekend

## 2022-03-31 ENCOUNTER — OFFICE VISIT (OUTPATIENT)
Dept: FAMILY MEDICINE CLINIC | Age: 27
End: 2022-03-31
Payer: COMMERCIAL

## 2022-03-31 ENCOUNTER — HOSPITAL ENCOUNTER (OUTPATIENT)
Age: 27
Setting detail: SPECIMEN
Discharge: HOME | End: 2022-03-31

## 2022-03-31 VITALS
HEART RATE: 112 BPM | BODY MASS INDEX: 31.76 KG/M2 | TEMPERATURE: 98.2 F | WEIGHT: 185 LBS | RESPIRATION RATE: 18 BRPM | OXYGEN SATURATION: 98 % | DIASTOLIC BLOOD PRESSURE: 78 MMHG | SYSTOLIC BLOOD PRESSURE: 118 MMHG

## 2022-03-31 DIAGNOSIS — N89.8 VAGINAL ITCHING: Primary | ICD-10-CM

## 2022-03-31 DIAGNOSIS — N93.9 VAGINAL SPOTTING: ICD-10-CM

## 2022-03-31 DIAGNOSIS — R30.0 DYSURIA: ICD-10-CM

## 2022-03-31 LAB
BILIRUBIN, POC: NEGATIVE
BLOOD URINE, POC: ABNORMAL
CLARITY, POC: CLEAR
COLOR, POC: YELLOW
CONTROL: PRESENT
GLUCOSE URINE, POC: NEGATIVE
KETONES, POC: NEGATIVE
LEUKOCYTE EST, POC: ABNORMAL
NITRITE, POC: NEGATIVE
PH, POC: 7
PREGNANCY TEST URINE, POC: NEGATIVE
PROTEIN, POC: NEGATIVE
SPECIFIC GRAVITY, POC: 1.02
UROBILINOGEN, POC: ABNORMAL

## 2022-03-31 PROCEDURE — 81003 URINALYSIS AUTO W/O SCOPE: CPT | Performed by: STUDENT IN AN ORGANIZED HEALTH CARE EDUCATION/TRAINING PROGRAM

## 2022-03-31 PROCEDURE — G8417 CALC BMI ABV UP PARAM F/U: HCPCS | Performed by: STUDENT IN AN ORGANIZED HEALTH CARE EDUCATION/TRAINING PROGRAM

## 2022-03-31 PROCEDURE — 99213 OFFICE O/P EST LOW 20 MIN: CPT | Performed by: STUDENT IN AN ORGANIZED HEALTH CARE EDUCATION/TRAINING PROGRAM

## 2022-03-31 PROCEDURE — 1036F TOBACCO NON-USER: CPT | Performed by: STUDENT IN AN ORGANIZED HEALTH CARE EDUCATION/TRAINING PROGRAM

## 2022-03-31 PROCEDURE — 81002 URINALYSIS NONAUTO W/O SCOPE: CPT | Performed by: STUDENT IN AN ORGANIZED HEALTH CARE EDUCATION/TRAINING PROGRAM

## 2022-03-31 PROCEDURE — 81025 URINE PREGNANCY TEST: CPT | Performed by: STUDENT IN AN ORGANIZED HEALTH CARE EDUCATION/TRAINING PROGRAM

## 2022-03-31 PROCEDURE — G8427 DOCREV CUR MEDS BY ELIG CLIN: HCPCS | Performed by: STUDENT IN AN ORGANIZED HEALTH CARE EDUCATION/TRAINING PROGRAM

## 2022-03-31 PROCEDURE — G8484 FLU IMMUNIZE NO ADMIN: HCPCS | Performed by: STUDENT IN AN ORGANIZED HEALTH CARE EDUCATION/TRAINING PROGRAM

## 2022-03-31 RX ORDER — FLUCONAZOLE 150 MG/1
TABLET ORAL
Qty: 2 TABLET | Refills: 0 | Status: SHIPPED | OUTPATIENT
Start: 2022-03-31 | End: 2022-04-19 | Stop reason: ALTCHOICE

## 2022-03-31 RX ORDER — NITROFURANTOIN 25; 75 MG/1; MG/1
100 CAPSULE ORAL 2 TIMES DAILY
Qty: 10 CAPSULE | Refills: 0 | Status: SHIPPED | OUTPATIENT
Start: 2022-03-31 | End: 2022-04-05

## 2022-03-31 ASSESSMENT — PATIENT HEALTH QUESTIONNAIRE - PHQ9
SUM OF ALL RESPONSES TO PHQ QUESTIONS 1-9: 0
1. LITTLE INTEREST OR PLEASURE IN DOING THINGS: 0
SUM OF ALL RESPONSES TO PHQ QUESTIONS 1-9: 0
SUM OF ALL RESPONSES TO PHQ QUESTIONS 1-9: 0
SUM OF ALL RESPONSES TO PHQ9 QUESTIONS 1 & 2: 0
2. FEELING DOWN, DEPRESSED OR HOPELESS: 0
SUM OF ALL RESPONSES TO PHQ QUESTIONS 1-9: 0

## 2022-03-31 ASSESSMENT — ENCOUNTER SYMPTOMS
EYE PAIN: 0
NAUSEA: 0
ABDOMINAL PAIN: 0
CONSTIPATION: 0
COUGH: 0
DIARRHEA: 0
RHINORRHEA: 0
VOMITING: 0
SHORTNESS OF BREATH: 0
EYE REDNESS: 0

## 2022-03-31 NOTE — PROGRESS NOTES
Gita Lewis (:  1995) is a 27 y.o. female,Established patient, here for evaluation of the following chief complaint(s):  Urinary Tract Infection (took plan b last saturday / itching / burning / spotting this morning )         ASSESSMENT/PLAN:  1. Vaginal itching  -     Culture, Urine  -     C. Trachomatis / N. Gonorrhoeae, DNA  -     Trichomonas Vaginalis by SONA; Future  -     fluconazole (DIFLUCAN) 150 MG tablet; Take 1 tablet (150mg) by mouth on day 1 then repeat in 72 hours. , Disp-2 tablet, R-0Normal  2. Dysuria  -     POCT Urinalysis No Micro (Auto)  -     POCT urine pregnancy  -     Culture, Urine  -     C. Trachomatis / N. Gonorrhoeae, DNA  -     Trichomonas Vaginalis by SONA; Future  -     nitrofurantoin, macrocrystal-monohydrate, (MACROBID) 100 MG capsule; Take 1 capsule by mouth 2 times daily for 5 days, Disp-10 capsule, R-0Normal  3. Vaginal spotting  60-year-old female presents the office for concern of vaginal itching, dysuria, vaginal spotting. Etiology of patient's symptoms consistent with acute urinary tract infection. There is a concern for possible underlying STI will send urine for gonorrhea/chlamydia/trichomonas. We will plan to start [address] for 5 days. Due to vaginal itching and history of yeast infections will send in diflucan. Urine pregnancy test in the office negative. Point-of-care urinalysis positive for blood and leukocyte esterase. Patient was educated on safe sex practices and pregnancy prevention. Vaginal spotting is most likely result from withdrawal bleeding secondary to the progesterone from Plan B. Patient was educated that symptoms worsen, do not prove, new concerning symptoms arise she return to the office for further evaluation and vaginal exam.  Will call patient with results. Return if symptoms worsen or fail to improve.        Subjective   SUBJECTIVE/OBJECTIVE:  HPI  60-year-old female presents the office for concerns of vaginal itching, burning, vaginal bleeding. Patient is currently on a birth control. She had a unprotected sexual encounter with a male partner approximately 1 week ago. She is Plan B post sexual encounter. The day after she experienced vaginal itching, burning. Now 1 week after she is grams of vaginal spotting. She continues to have vaginal itching and burning is concerned that she may have an infection. Patient denies use any form of birth control. She admits to history of recurrent yeast infections, and one history of chlamydial diagnosis multiple years ago that was treated appropriately. Patient does admit to some swelling of her vulva. Admits to some burning/discomfort with urination. Denies any vaginal discharge, odor. Patient is interested in getting checked to see if she has an infection and discussing what she can do for symptoms. Past Medical History:   Diagnosis Date    Anxiety     Postpartum depression     zoloft- plan to get back on after delivered       Past Surgical History:   Procedure Laterality Date    WISDOM TOOTH EXTRACTION  2020       History reviewed. No pertinent family history. Social History     Tobacco Use    Smoking status: Never Smoker    Smokeless tobacco: Never Used   Vaping Use    Vaping Use: Never used   Substance Use Topics    Alcohol use: No    Drug use: No         Current Outpatient Medications:     nitrofurantoin, macrocrystal-monohydrate, (MACROBID) 100 MG capsule, Take 1 capsule by mouth 2 times daily for 5 days, Disp: 10 capsule, Rfl: 0    fluconazole (DIFLUCAN) 150 MG tablet, Take 1 tablet (150mg) by mouth on day 1 then repeat in 72 hours. , Disp: 2 tablet, Rfl: 0    Allergies   Allergen Reactions    Keflex [Cephalexin]      Years ago. States someone told her she was allergic to it       Review of Systems   Constitutional: Negative for fatigue and fever. HENT: Negative for congestion, postnasal drip and rhinorrhea. Eyes: Negative for pain and redness.    Respiratory: Negative for cough and shortness of breath. Cardiovascular: Negative for chest pain and palpitations. Gastrointestinal: Negative for abdominal pain, constipation, diarrhea, nausea and vomiting. Genitourinary: Positive for difficulty urinating, dyspareunia, dysuria, menstrual problem and vaginal pain (itching). Negative for decreased urine volume, flank pain, frequency, genital sores, pelvic pain, vaginal bleeding and vaginal discharge. Neurological: Negative for dizziness and headaches. Objective   Vitals:    03/31/22 0852   BP: 118/78   Pulse: 112   Resp: 18   Temp: 98.2 °F (36.8 °C)   SpO2: 98%     Physical Exam  Vitals and nursing note reviewed. Constitutional:       General: She is not in acute distress. Appearance: Normal appearance. Cardiovascular:      Rate and Rhythm: Normal rate and regular rhythm. Pulses: Normal pulses. Heart sounds: No murmur heard. Pulmonary:      Effort: Pulmonary effort is normal. No respiratory distress. Breath sounds: Normal breath sounds. Abdominal:      General: Bowel sounds are normal. There is no distension. Palpations: Abdomen is soft. Tenderness: There is no abdominal tenderness. There is no right CVA tenderness, left CVA tenderness or guarding. Genitourinary:     Comments: Offered  exam, patient declines. Skin:     General: Skin is warm and dry. Capillary Refill: Capillary refill takes less than 2 seconds. Neurological:      General: No focal deficit present. Mental Status: She is alert. Mental status is at baseline. Psychiatric:         Mood and Affect: Mood normal.         Behavior: Behavior normal.         Thought Content: Thought content normal.         Judgment: Judgment normal.                  An electronic signature was used to authenticate this note. --Pari Velasco DO          **This report has been created using voice recognition software.  It may contain minor errors which are inherent in voice recognition technology. **

## 2022-04-01 DIAGNOSIS — R30.0 DYSURIA: ICD-10-CM

## 2022-04-01 DIAGNOSIS — N89.8 VAGINAL ITCHING: ICD-10-CM

## 2022-04-01 LAB
SOURCE: NORMAL
TRICHOMONAS VAGINALI, MOLECULAR: NEGATIVE

## 2022-04-02 LAB
CULTURE: ABNORMAL
SPECIMEN DESCRIPTION: ABNORMAL

## 2022-04-04 LAB
C. TRACHOMATIS DNA ,URINE: NEGATIVE
N. GONORRHOEAE DNA, URINE: NEGATIVE
SPECIMEN DESCRIPTION: NORMAL

## 2022-04-19 ENCOUNTER — HOSPITAL ENCOUNTER (OUTPATIENT)
Age: 27
Setting detail: SPECIMEN
Discharge: HOME | End: 2022-04-19

## 2022-04-19 ENCOUNTER — OFFICE VISIT (OUTPATIENT)
Dept: FAMILY MEDICINE CLINIC | Age: 27
End: 2022-04-19
Payer: COMMERCIAL

## 2022-04-19 VITALS
HEART RATE: 110 BPM | OXYGEN SATURATION: 99 % | TEMPERATURE: 97.3 F | BODY MASS INDEX: 32.03 KG/M2 | RESPIRATION RATE: 18 BRPM | WEIGHT: 186.6 LBS | SYSTOLIC BLOOD PRESSURE: 122 MMHG | DIASTOLIC BLOOD PRESSURE: 82 MMHG

## 2022-04-19 DIAGNOSIS — N30.90 CYSTITIS: ICD-10-CM

## 2022-04-19 DIAGNOSIS — R30.0 DYSURIA: Primary | ICD-10-CM

## 2022-04-19 LAB
BILIRUBIN, POC: NEGATIVE
BLOOD URINE, POC: NEGATIVE
CLARITY, POC: ABNORMAL
COLOR, POC: YELLOW
CONTROL: PRESENT
GLUCOSE URINE, POC: NEGATIVE
KETONES, POC: NEGATIVE
LEUKOCYTE EST, POC: ABNORMAL
NITRITE, POC: NEGATIVE
PH, POC: 7
PREGNANCY TEST URINE, POC: NEGATIVE
PROTEIN, POC: NEGATIVE
SPECIFIC GRAVITY, POC: 1.02
UROBILINOGEN, POC: ABNORMAL

## 2022-04-19 PROCEDURE — 99213 OFFICE O/P EST LOW 20 MIN: CPT | Performed by: NURSE PRACTITIONER

## 2022-04-19 PROCEDURE — 81003 URINALYSIS AUTO W/O SCOPE: CPT | Performed by: NURSE PRACTITIONER

## 2022-04-19 PROCEDURE — G8417 CALC BMI ABV UP PARAM F/U: HCPCS | Performed by: NURSE PRACTITIONER

## 2022-04-19 PROCEDURE — G8427 DOCREV CUR MEDS BY ELIG CLIN: HCPCS | Performed by: NURSE PRACTITIONER

## 2022-04-19 PROCEDURE — 81025 URINE PREGNANCY TEST: CPT | Performed by: NURSE PRACTITIONER

## 2022-04-19 PROCEDURE — 1036F TOBACCO NON-USER: CPT | Performed by: NURSE PRACTITIONER

## 2022-04-19 RX ORDER — AMOXICILLIN 875 MG/1
875 TABLET, COATED ORAL 2 TIMES DAILY
Qty: 20 TABLET | Refills: 0 | Status: SHIPPED | OUTPATIENT
Start: 2022-04-19 | End: 2022-04-29

## 2022-04-19 ASSESSMENT — PATIENT HEALTH QUESTIONNAIRE - PHQ9
2. FEELING DOWN, DEPRESSED OR HOPELESS: 0
SUM OF ALL RESPONSES TO PHQ QUESTIONS 1-9: 0
1. LITTLE INTEREST OR PLEASURE IN DOING THINGS: 0
SUM OF ALL RESPONSES TO PHQ QUESTIONS 1-9: 0
SUM OF ALL RESPONSES TO PHQ9 QUESTIONS 1 & 2: 0

## 2022-04-19 NOTE — PROGRESS NOTES
Jaclynelvis Snare [address] [facility] [place]. [facility] [address]  Dept: [phone]  Dept Fax: [phone]    Visit type: Established patient    Reason for Visit: Urinary Tract Infection (itching/increased odor to urine/ declines burning or pain when urinating/ declines blood when urinating/began yesterday) and Health Maintenance (HPV/ pap/Covid)         Assessment and Plan       1. Dysuria  -     POCT Urinalysis No Micro (Auto)  -     POCT urine pregnancy  -     Culture, Urine  2. Cystitis  -     amoxicillin (AMOXIL) 875 MG tablet; Take 1 tablet by mouth 2 times daily for 10 days, Disp-20 tablet, R-0Normal    Will start amoxil to cover group b strep culture positive in urine, still showing leukocytes in urine, negative pregnancy test   Return if symptoms worsen or fail to improve. Subjective       Was treated for a UTI x 2 weeks ago and given macrobid. Culture grew group b strep. She states that the dysuria and smell did not ever improve. No nausea, emesis, flank pain, fever sweat or chills. Had STD screening at that time in which was also normal.       Review of Systems   Constitutional: Negative for activity change, appetite change, chills, diaphoresis, fatigue and fever. Genitourinary: Positive for dysuria and frequency. Negative for decreased urine volume, enuresis, flank pain, pelvic pain and urgency. Skin: Negative for rash. Allergies   Allergen Reactions    Keflex [Cephalexin]      Years ago. States someone told her she was allergic to it       Outpatient Medications Prior to Visit   Medication Sig Dispense Refill    fluconazole (DIFLUCAN) 150 MG tablet Take 1 tablet (150mg) by mouth on day 1 then repeat in 72 hours. (Patient not taking: Reported on 4/19/2022) 2 tablet 0     No facility-administered medications prior to visit.         Past Medical History:   Diagnosis Date    Anxiety     Postpartum depression     zoloft- plan to get back on after delivered Social History     Tobacco Use    Smoking status: Never Smoker    Smokeless tobacco: Never Used   Substance Use Topics    Alcohol use: No        Past Surgical History:   Procedure Laterality Date    WISDOM TOOTH EXTRACTION  2020       History reviewed. No pertinent family history. Objective       /82   Pulse 110   Temp 97.3 °F (36.3 °C) (Temporal)   Resp 18   Wt 186 lb 9.6 oz (84.6 kg)   LMP 03/20/2022 (Exact Date)   SpO2 99%   BMI 32.03 kg/m²   Physical Exam  Vitals reviewed. Constitutional:       Appearance: She is well-developed. HENT:      Head: Normocephalic and atraumatic. Right Ear: External ear normal.      Left Ear: External ear normal.   Eyes:      Conjunctiva/sclera: Conjunctivae normal.   Neck:      Thyroid: No thyromegaly. Trachea: Trachea normal.   Cardiovascular:      Rate and Rhythm: Normal rate and regular rhythm. Heart sounds: Normal heart sounds. No murmur heard. No friction rub. No gallop. Pulmonary:      Effort: Pulmonary effort is normal.      Breath sounds: Normal breath sounds. Abdominal:      General: Bowel sounds are normal.      Palpations: Abdomen is soft. Tenderness: There is no abdominal tenderness. There is no right CVA tenderness or left CVA tenderness. Musculoskeletal:         General: Normal range of motion. Cervical back: Normal range of motion and neck supple. No spinous process tenderness. Skin:     General: Skin is warm and dry. Findings: No erythema or rash. Neurological:      Mental Status: She is alert and oriented to person, place, and time. Psychiatric:         Speech: Speech normal.         Behavior: Behavior normal.         Thought Content:  Thought content normal.           Data Reviewed and Summarized       Labs:     Imaging/Testing:    Contains abnormal data Culture, Urine  Order: [id number]   Status: Final result     Visible to patient: Yes (seen)     Next appt: None    Specimen Information: Urine       2 Result Notes    Component 3/31/22 0748   Specimen Description . URINE    Culture STREPTOCOCCI, BETA HEMOLYTIC GROUP B 10 to 50,000 CFU/ML Abnormal     Resulting [address]              Specimen Collected: 03/31/22 07:48 Last Resulted: 04/02/22 08:55        Lab Flowsheet     Order Details     View Encounter     Lab and Collection Details     Routing     Result History             Result Care Coordination              Carmenza Henson, APRN - CNP

## 2022-04-20 LAB
CULTURE: NORMAL
SPECIMEN DESCRIPTION: NORMAL

## 2022-04-29 ENCOUNTER — PATIENT MESSAGE (OUTPATIENT)
Dept: FAMILY MEDICINE CLINIC | Age: 27
End: 2022-04-29

## 2022-04-29 DIAGNOSIS — B96.89 BACTERIAL VAGINOSIS: Primary | ICD-10-CM

## 2022-04-29 DIAGNOSIS — N76.0 BACTERIAL VAGINOSIS: Primary | ICD-10-CM

## 2022-04-29 NOTE — TELEPHONE ENCOUNTER
From: Kristine Euceda  To: Blu Allen  Sent: 4/29/2022 7:08 AM EDT  Subject: Uti    I still have this uti. Im still itching, like a yellow discharge and it kind of hurts. Wondering if I can refill my prescription. Or try a new one.  Thank you

## 2022-05-01 RX ORDER — METRONIDAZOLE 500 MG/1
500 TABLET ORAL 2 TIMES DAILY
Qty: 14 TABLET | Refills: 0 | Status: SHIPPED | OUTPATIENT
Start: 2022-05-01 | End: 2022-05-08

## 2022-09-22 ENCOUNTER — OFFICE VISIT (OUTPATIENT)
Dept: FAMILY MEDICINE CLINIC | Age: 27
End: 2022-09-22
Payer: COMMERCIAL

## 2022-09-22 VITALS
HEART RATE: 87 BPM | RESPIRATION RATE: 16 BRPM | OXYGEN SATURATION: 98 % | BODY MASS INDEX: 32.51 KG/M2 | TEMPERATURE: 97.6 F | DIASTOLIC BLOOD PRESSURE: 81 MMHG | SYSTOLIC BLOOD PRESSURE: 115 MMHG | WEIGHT: 189.4 LBS

## 2022-09-22 DIAGNOSIS — J30.2 SEASONAL ALLERGIES: Primary | ICD-10-CM

## 2022-09-22 DIAGNOSIS — J01.11 ACUTE RECURRENT FRONTAL SINUSITIS: ICD-10-CM

## 2022-09-22 PROCEDURE — G8417 CALC BMI ABV UP PARAM F/U: HCPCS | Performed by: NURSE PRACTITIONER

## 2022-09-22 PROCEDURE — G8427 DOCREV CUR MEDS BY ELIG CLIN: HCPCS | Performed by: NURSE PRACTITIONER

## 2022-09-22 PROCEDURE — 1036F TOBACCO NON-USER: CPT | Performed by: NURSE PRACTITIONER

## 2022-09-22 PROCEDURE — 99214 OFFICE O/P EST MOD 30 MIN: CPT | Performed by: NURSE PRACTITIONER

## 2022-09-22 RX ORDER — CETIRIZINE HYDROCHLORIDE 10 MG/1
10 TABLET ORAL DAILY
Qty: 30 TABLET | Refills: 0 | Status: SHIPPED | OUTPATIENT
Start: 2022-09-22 | End: 2022-10-22

## 2022-09-22 RX ORDER — AMOXICILLIN AND CLAVULANATE POTASSIUM 875; 125 MG/1; MG/1
1 TABLET, FILM COATED ORAL 2 TIMES DAILY WITH MEALS
Qty: 20 TABLET | Refills: 0 | Status: SHIPPED | OUTPATIENT
Start: 2022-09-22 | End: 2022-10-02

## 2022-09-22 RX ORDER — FLUTICASONE PROPIONATE 50 MCG
2 SPRAY, SUSPENSION (ML) NASAL DAILY
Qty: 16 G | Refills: 11 | Status: SHIPPED | OUTPATIENT
Start: 2022-09-22

## 2022-09-22 SDOH — ECONOMIC STABILITY: FOOD INSECURITY: WITHIN THE PAST 12 MONTHS, THE FOOD YOU BOUGHT JUST DIDN'T LAST AND YOU DIDN'T HAVE MONEY TO GET MORE.: NEVER TRUE

## 2022-09-22 SDOH — ECONOMIC STABILITY: FOOD INSECURITY: WITHIN THE PAST 12 MONTHS, YOU WORRIED THAT YOUR FOOD WOULD RUN OUT BEFORE YOU GOT MONEY TO BUY MORE.: NEVER TRUE

## 2022-09-22 ASSESSMENT — PATIENT HEALTH QUESTIONNAIRE - PHQ9
SUM OF ALL RESPONSES TO PHQ QUESTIONS 1-9: 0
SUM OF ALL RESPONSES TO PHQ9 QUESTIONS 1 & 2: 0
SUM OF ALL RESPONSES TO PHQ QUESTIONS 1-9: 0
1. LITTLE INTEREST OR PLEASURE IN DOING THINGS: 0
SUM OF ALL RESPONSES TO PHQ QUESTIONS 1-9: 0
2. FEELING DOWN, DEPRESSED OR HOPELESS: 0
SUM OF ALL RESPONSES TO PHQ QUESTIONS 1-9: 0

## 2022-09-22 ASSESSMENT — ENCOUNTER SYMPTOMS
SINUS PRESSURE: 1
SINUS PAIN: 1
RHINORRHEA: 1
VOMITING: 0
COUGH: 1
DIARRHEA: 0
CONSTIPATION: 0
SORE THROAT: 1

## 2022-09-22 ASSESSMENT — SOCIAL DETERMINANTS OF HEALTH (SDOH): HOW HARD IS IT FOR YOU TO PAY FOR THE VERY BASICS LIKE FOOD, HOUSING, MEDICAL CARE, AND HEATING?: NOT HARD AT ALL

## 2022-09-22 NOTE — PROGRESS NOTES
Mary Ann Leroy [address] [facility]. [facility] [address]  Dept: [phone]  Dept Fax: [phone]    Visit type: Established patient    Reason for Visit: Sinus Problem (2 weeks ago - says unbearable ), Headache (2 weeks ago ), and Health Maintenance (Vaccines /Pap )         Assessment and Plan       1. Seasonal allergies  -     fluticasone (FLONASE) 50 MCG/ACT nasal spray; 2 sprays by Nasal route daily, Disp-16 g, R-11Normal  -     cetirizine (ZYRTEC) 10 MG tablet; Take 1 tablet by mouth daily, Disp-30 tablet, R-0Normal  2. Acute recurrent frontal sinusitis  -     amoxicillin-clavulanate (AUGMENTIN) 875-125 MG per tablet; Take 1 tablet by mouth 2 times daily (with meals) for 10 days, Disp-20 tablet, R-0Normal    New start preventative medications for allergies- flonase and zyrtec, also recommend sinus rinses OTC  Will treat sinus infection with augmentin today  Can continue using cool or warm compresses to face, cool mist humidifier   Return if symptoms worsen or fail to improve. Subjective       Sinus headache  Onest x 2 weeks ago  Right side is worse, congestion, sinus pressure  Lost of voice that is coming and going  Dry cough  Trigger with allergies-   Dental pain   Has tried excedrin extra strength          Review of Systems   Constitutional:  Negative for activity change, appetite change and fatigue. HENT:  Positive for congestion, postnasal drip, rhinorrhea, sinus pressure, sinus pain and sore throat. Respiratory:  Positive for cough. Gastrointestinal:  Negative for constipation, diarrhea and vomiting. Allergies   Allergen Reactions    Keflex [Cephalexin]      Years ago. States someone told her she was allergic to it       Outpatient Medications Prior to Visit   Medication Sig Dispense Refill    Aspirin-Acetaminophen-Caffeine (EXCEDRIN PO) Take by mouth Taking as needed for headaches       No facility-administered medications prior to visit. Past Medical History:   Diagnosis Date    Anxiety     Postpartum depression     zoloft- plan to get back on after delivered        Social History     Tobacco Use    Smoking status: Never    Smokeless tobacco: Never   Substance Use Topics    Alcohol use: No        Past Surgical History:   Procedure Laterality Date    WISDOM TOOTH EXTRACTION  2020       No family history on file. Objective       /81   Pulse 87   Temp 97.6 °F (36.4 °C) (Temporal)   Resp 16   Wt 189 lb 6.4 oz (85.9 kg)   LMP 09/06/2022 (Exact Date)   SpO2 98%   BMI 32.51 kg/m²   Physical Exam  Vitals reviewed. Constitutional:       Appearance: She is well-developed. HENT:      Head: Normocephalic and atraumatic. Right Ear: Tympanic membrane and external ear normal.      Left Ear: Tympanic membrane and external ear normal.      Nose: Congestion present. Right Sinus: Maxillary sinus tenderness and frontal sinus tenderness present. Mouth/Throat:      Pharynx: Oropharynx is clear. Uvula midline. Tonsils: No tonsillar exudate or tonsillar abscesses. Eyes:      Conjunctiva/sclera: Conjunctivae normal.   Neck:      Thyroid: No thyromegaly. Trachea: Trachea normal.   Cardiovascular:      Rate and Rhythm: Normal rate and regular rhythm. Heart sounds: Normal heart sounds. No murmur heard. No friction rub. No gallop. Pulmonary:      Effort: Pulmonary effort is normal.      Breath sounds: Normal breath sounds. Abdominal:      General: Bowel sounds are normal.      Palpations: Abdomen is soft. Tenderness: There is no abdominal tenderness. Musculoskeletal:         General: Normal range of motion. Cervical back: Normal range of motion and neck supple. No spinous process tenderness. Skin:     General: Skin is warm and dry. Findings: No erythema or rash. Neurological:      Mental Status: She is alert and oriented to person, place, and time.    Psychiatric:         Speech: Speech normal.         Behavior: Behavior normal.         Thought Content:  Thought content normal.       Data Reviewed and Summarized       Labs:     Imaging/Testing:        Chanel Issa, LATRICIA - CNP

## 2022-12-28 ENCOUNTER — OFFICE VISIT (OUTPATIENT)
Dept: FAMILY MEDICINE CLINIC | Age: 27
End: 2022-12-28
Payer: COMMERCIAL

## 2022-12-28 ENCOUNTER — HOSPITAL ENCOUNTER (OUTPATIENT)
Age: 27
Setting detail: SPECIMEN
Discharge: HOME | End: 2022-12-28

## 2022-12-28 VITALS
HEART RATE: 72 BPM | DIASTOLIC BLOOD PRESSURE: 88 MMHG | TEMPERATURE: 97.7 F | WEIGHT: 198.6 LBS | HEIGHT: 64 IN | OXYGEN SATURATION: 98 % | BODY MASS INDEX: 33.9 KG/M2 | SYSTOLIC BLOOD PRESSURE: 128 MMHG | RESPIRATION RATE: 16 BRPM

## 2022-12-28 DIAGNOSIS — R30.0 DYSURIA: Primary | ICD-10-CM

## 2022-12-28 LAB
BILIRUBIN, POC: NORMAL
BLOOD URINE, POC: NORMAL
CLARITY, POC: CLEAR
COLOR, POC: YELLOW
GLUCOSE URINE, POC: NORMAL
KETONES, POC: NORMAL
LEUKOCYTE EST, POC: NORMAL
NITRITE, POC: NORMAL
PH, POC: 7
PROTEIN, POC: NORMAL
SPECIFIC GRAVITY, POC: 1.02
UROBILINOGEN, POC: 0.2

## 2022-12-28 PROCEDURE — G8484 FLU IMMUNIZE NO ADMIN: HCPCS | Performed by: STUDENT IN AN ORGANIZED HEALTH CARE EDUCATION/TRAINING PROGRAM

## 2022-12-28 PROCEDURE — G8427 DOCREV CUR MEDS BY ELIG CLIN: HCPCS | Performed by: STUDENT IN AN ORGANIZED HEALTH CARE EDUCATION/TRAINING PROGRAM

## 2022-12-28 PROCEDURE — G8417 CALC BMI ABV UP PARAM F/U: HCPCS | Performed by: STUDENT IN AN ORGANIZED HEALTH CARE EDUCATION/TRAINING PROGRAM

## 2022-12-28 PROCEDURE — 99213 OFFICE O/P EST LOW 20 MIN: CPT | Performed by: STUDENT IN AN ORGANIZED HEALTH CARE EDUCATION/TRAINING PROGRAM

## 2022-12-28 PROCEDURE — 81003 URINALYSIS AUTO W/O SCOPE: CPT | Performed by: STUDENT IN AN ORGANIZED HEALTH CARE EDUCATION/TRAINING PROGRAM

## 2022-12-28 PROCEDURE — 1036F TOBACCO NON-USER: CPT | Performed by: STUDENT IN AN ORGANIZED HEALTH CARE EDUCATION/TRAINING PROGRAM

## 2022-12-28 ASSESSMENT — PATIENT HEALTH QUESTIONNAIRE - PHQ9
SUM OF ALL RESPONSES TO PHQ QUESTIONS 1-9: 0
SUM OF ALL RESPONSES TO PHQ QUESTIONS 1-9: 0
2. FEELING DOWN, DEPRESSED OR HOPELESS: 0
1. LITTLE INTEREST OR PLEASURE IN DOING THINGS: 0
SUM OF ALL RESPONSES TO PHQ QUESTIONS 1-9: 0
SUM OF ALL RESPONSES TO PHQ9 QUESTIONS 1 & 2: 0
SUM OF ALL RESPONSES TO PHQ QUESTIONS 1-9: 0

## 2022-12-28 ASSESSMENT — ENCOUNTER SYMPTOMS
COUGH: 0
SHORTNESS OF BREATH: 0
CONSTIPATION: 0
NAUSEA: 0
VOMITING: 0
ABDOMINAL PAIN: 0
DIARRHEA: 0

## 2022-12-28 NOTE — PROGRESS NOTES
Caitlin Cisneros (:  1995) is a 24 y.o. female,Established patient, here for evaluation of the following chief complaint(s):  Urinary Tract Infection (Lower abdominal pain, urinary frequency, some blood x 2022. Taking AZO/)         ASSESSMENT/PLAN:  1. Dysuria  -     POCT Urinalysis No Micro (Auto)  -     Culture, Urine  72-year-old female presents the office as an acute walk-in for concerns of urinary tract infection. Patient has been experiencing suprapubic abdominal pressure/pain with increased urinary frequency and dysuria for the past 4 days. Was originally taking Azo but has stopped this. Etiology of patient's symptoms concerning for possible underlying urinary tract infection secondary to not appropriately urinating after sexual intercourse. We will plan to send urine for culture and call patient with culture results. We will hold off sending in antibiotics at this time until culture results are back. Patient educated on increasing fluid intake to help flush urinary system. 22 10:00   Protein, UA -   Color, UA yellow   Clarity, UA clear   Glucose, UA POC -   Bilirubin, UA -   Ketones, UA -   Spec Grav, UA 1.020   pH, UA 7.0   Urobilinogen, UA 0.2   Nitrite, UA -   Leukocytes, UA small   Blood, UA POC trace       Return if symptoms worsen or fail to improve. Subjective   SUBJECTIVE/OBJECTIVE:  72-year-old female presents the office for a 4-day history of suprapubic tenderness/discomfort, increased urination, dysuria. Patient states that she is currently in a sexually active monogamous relationship with a partner. On further questioning patient states that she does not always urinate after sexual activity or performs appropriate hygiene. Patient after symptoms started she started using some Azo which did help mildly but did not resolve the symptoms. Patient went to come in for evaluation to make sure she did not have a urinary tract infection.       Past Medical History:   Diagnosis Date    Anxiety     Depression     Postpartum depression     zoloft- plan to get back on after delivered       Past Surgical History:   Procedure Laterality Date    WISDOM TOOTH EXTRACTION  2020       History reviewed. No pertinent family history. Social History     Tobacco Use    Smoking status: Never    Smokeless tobacco: Never   Vaping Use    Vaping Use: Never used   Substance Use Topics    Alcohol use: No    Drug use: No         Current Outpatient Medications:     Aspirin-Acetaminophen-Caffeine (EXCEDRIN PO), Take by mouth Taking as needed for headaches, Disp: , Rfl:     Allergies   Allergen Reactions    Keflex [Cephalexin]      Years ago. States someone told her she was allergic to it       Review of Systems   Constitutional:  Negative for fatigue and fever. Respiratory:  Negative for cough and shortness of breath. Cardiovascular:  Negative for chest pain and palpitations. Gastrointestinal:  Negative for abdominal pain, constipation, diarrhea, nausea and vomiting. Genitourinary:  Positive for dysuria, frequency and urgency. Negative for decreased urine volume, difficulty urinating, flank pain, menstrual problem, pelvic pain, vaginal bleeding, vaginal discharge and vaginal pain. Skin:  Negative for pallor and rash. Neurological:  Negative for dizziness and headaches. Objective   Vitals:    12/28/22 0958   BP: 128/88   Pulse: 72   Resp: 16   Temp: 97.7 °F (36.5 °C)   SpO2: 98%       Physical Exam  Vitals and nursing note reviewed. Constitutional:       General: She is not in acute distress. Appearance: Normal appearance. Cardiovascular:      Rate and Rhythm: Normal rate and regular rhythm. Pulses: Normal pulses. Heart sounds: No murmur heard. Pulmonary:      Effort: Pulmonary effort is normal. No respiratory distress. Breath sounds: Normal breath sounds. Abdominal:      General: Bowel sounds are normal. There is no distension.       Palpations: Abdomen is soft. Tenderness: There is no abdominal tenderness. There is no right CVA tenderness, left CVA tenderness or guarding. Musculoskeletal:      Cervical back: Neck supple. Lymphadenopathy:      Cervical: No cervical adenopathy. Neurological:      Mental Status: She is alert. An electronic signature was used to authenticate this note. --Radhika Navarro DO          **This report has been created using voice recognition software. It may contain minor errors which are inherent in voice recognition technology. **

## 2022-12-30 DIAGNOSIS — N30.00 ACUTE CYSTITIS WITHOUT HEMATURIA: Primary | ICD-10-CM

## 2022-12-30 LAB
CULTURE: ABNORMAL
SPECIMEN DESCRIPTION: ABNORMAL

## 2022-12-30 RX ORDER — NITROFURANTOIN 25; 75 MG/1; MG/1
100 CAPSULE ORAL 2 TIMES DAILY
Qty: 14 CAPSULE | Refills: 0 | Status: SHIPPED | OUTPATIENT
Start: 2022-12-30 | End: 2023-01-06

## 2023-03-14 ENCOUNTER — PATIENT MESSAGE (OUTPATIENT)
Dept: FAMILY MEDICINE CLINIC | Age: 28
End: 2023-03-14

## 2023-03-14 DIAGNOSIS — N30.00 ACUTE CYSTITIS WITHOUT HEMATURIA: Primary | ICD-10-CM

## 2023-03-14 RX ORDER — NITROFURANTOIN 25; 75 MG/1; MG/1
100 CAPSULE ORAL 2 TIMES DAILY
Qty: 14 CAPSULE | Refills: 0 | Status: SHIPPED | OUTPATIENT
Start: 2023-03-14 | End: 2023-03-21

## 2023-03-14 NOTE — TELEPHONE ENCOUNTER
From: Seamus Enamorado  To: Vijay López  Sent: 3/14/2023 8:43 AM EDT  Subject: Bladder infection again    Hello, just wondering since Man Ellis has no appointments today if she could send me a prescription in for my bladder infection/Ecoli. I have the same symptoms as the same time when I saw Dr Dominga Fontaine. I dont really think my infection ever went away but its very painful and I just dont know if I can wait another day. Like I said same symptoms, fever, very painful when urinating, bloating, slight smell.

## 2023-05-04 ENCOUNTER — OFFICE VISIT (OUTPATIENT)
Dept: FAMILY MEDICINE CLINIC | Age: 28
End: 2023-05-04
Payer: COMMERCIAL

## 2023-05-04 VITALS
TEMPERATURE: 98.4 F | HEART RATE: 101 BPM | WEIGHT: 200 LBS | DIASTOLIC BLOOD PRESSURE: 82 MMHG | OXYGEN SATURATION: 97 % | SYSTOLIC BLOOD PRESSURE: 126 MMHG | RESPIRATION RATE: 16 BRPM | BODY MASS INDEX: 34.33 KG/M2

## 2023-05-04 DIAGNOSIS — F41.1 GAD (GENERALIZED ANXIETY DISORDER): ICD-10-CM

## 2023-05-04 DIAGNOSIS — Z00.00 ANNUAL PHYSICAL EXAM: Primary | ICD-10-CM

## 2023-05-04 PROCEDURE — 99395 PREV VISIT EST AGE 18-39: CPT | Performed by: NURSE PRACTITIONER

## 2023-05-04 RX ORDER — DESVENLAFAXINE 25 MG/1
25 TABLET, EXTENDED RELEASE ORAL DAILY
Qty: 30 TABLET | Refills: 1 | Status: SHIPPED | OUTPATIENT
Start: 2023-05-04

## 2023-05-04 SDOH — ECONOMIC STABILITY: FOOD INSECURITY: WITHIN THE PAST 12 MONTHS, THE FOOD YOU BOUGHT JUST DIDN'T LAST AND YOU DIDN'T HAVE MONEY TO GET MORE.: NEVER TRUE

## 2023-05-04 SDOH — ECONOMIC STABILITY: INCOME INSECURITY: HOW HARD IS IT FOR YOU TO PAY FOR THE VERY BASICS LIKE FOOD, HOUSING, MEDICAL CARE, AND HEATING?: NOT HARD AT ALL

## 2023-05-04 SDOH — ECONOMIC STABILITY: HOUSING INSECURITY
IN THE LAST 12 MONTHS, WAS THERE A TIME WHEN YOU DID NOT HAVE A STEADY PLACE TO SLEEP OR SLEPT IN A SHELTER (INCLUDING NOW)?: NO

## 2023-05-04 SDOH — ECONOMIC STABILITY: FOOD INSECURITY: WITHIN THE PAST 12 MONTHS, YOU WORRIED THAT YOUR FOOD WOULD RUN OUT BEFORE YOU GOT MONEY TO BUY MORE.: NEVER TRUE

## 2023-05-04 ASSESSMENT — ANXIETY QUESTIONNAIRES
2. NOT BEING ABLE TO STOP OR CONTROL WORRYING: 3
3. WORRYING TOO MUCH ABOUT DIFFERENT THINGS: 3
4. TROUBLE RELAXING: 3
IF YOU CHECKED OFF ANY PROBLEMS ON THIS QUESTIONNAIRE, HOW DIFFICULT HAVE THESE PROBLEMS MADE IT FOR YOU TO DO YOUR WORK, TAKE CARE OF THINGS AT HOME, OR GET ALONG WITH OTHER PEOPLE: EXTREMELY DIFFICULT
GAD7 TOTAL SCORE: 17
5. BEING SO RESTLESS THAT IT IS HARD TO SIT STILL: 2
1. FEELING NERVOUS, ANXIOUS, OR ON EDGE: 3
7. FEELING AFRAID AS IF SOMETHING AWFUL MIGHT HAPPEN: 0
6. BECOMING EASILY ANNOYED OR IRRITABLE: 3

## 2023-05-04 ASSESSMENT — PATIENT HEALTH QUESTIONNAIRE - PHQ9
SUM OF ALL RESPONSES TO PHQ QUESTIONS 1-9: 0
SUM OF ALL RESPONSES TO PHQ QUESTIONS 1-9: 0
2. FEELING DOWN, DEPRESSED OR HOPELESS: 0
1. LITTLE INTEREST OR PLEASURE IN DOING THINGS: 0
SUM OF ALL RESPONSES TO PHQ QUESTIONS 1-9: 0
SUM OF ALL RESPONSES TO PHQ QUESTIONS 1-9: 0
SUM OF ALL RESPONSES TO PHQ9 QUESTIONS 1 & 2: 0

## 2023-05-04 ASSESSMENT — ENCOUNTER SYMPTOMS
RHINORRHEA: 0
SHORTNESS OF BREATH: 0
EYE DISCHARGE: 0
COUGH: 0
ABDOMINAL PAIN: 0
CONSTIPATION: 0
CHEST TIGHTNESS: 0
DIARRHEA: 0
VOMITING: 0

## 2023-05-04 NOTE — PROGRESS NOTES
Brooke Zavala [address] [facility], [facility] [place]. [facility] [address]  Dept: [phone]  Dept Fax: [phone]    Visit type: Established patient    Reason for Visit: Anxiety (Onset x \"always\" ), Health Maintenance (Vaccines; pap smear ), and Discuss Medications (Effexor - doesn't think she took it long enough to show any differences )         Assessment and Plan       1. Annual physical exam  2. THONY (generalized anxiety disorder)  -     desvenlafaxine succinate (PRISTIQ) 25 MG TB24 extended release tablet; Take 1 tablet by mouth daily, Disp-30 tablet, R-1Normal      New med start for anxiety  AMY for PAP  Heart healthy diet, increase exercise   Return in about 2 months (around 7/4/2023) for fu mood . Subjective       Here for annual exam    Diet- not following   Exercise not following   Dental - is due   Eye - has glasses and is up to date  PAP- just had one completed in February       THONY  Onset over a year ago  Past treatment- effexor, celexa and zoloft   Trigger- started school last week and is going for [employer]   Current symptoms- gets nervous in social situations and hands will sweat,  anxious, excessive worry, worries about multiple things, poor concentration, easily agitated, at times sleep will be disrupted        Review of Systems   Constitutional:  Negative for activity change, appetite change and fever. HENT:  Negative for congestion, ear pain and rhinorrhea. Eyes:  Negative for discharge and visual disturbance. Respiratory:  Negative for cough, chest tightness and shortness of breath. Cardiovascular:  Negative for chest pain and palpitations. Gastrointestinal:  Negative for abdominal pain, constipation, diarrhea and vomiting. Genitourinary:  Negative for difficulty urinating and hematuria. Musculoskeletal:  Negative for arthralgias and myalgias. Skin:  Negative for rash. Neurological:  Negative for dizziness, weakness, numbness and headaches.

## 2023-05-11 ENCOUNTER — PATIENT MESSAGE (OUTPATIENT)
Dept: FAMILY MEDICINE CLINIC | Age: 28
End: 2023-05-11

## 2023-05-11 ENCOUNTER — OFFICE VISIT (OUTPATIENT)
Dept: FAMILY MEDICINE CLINIC | Age: 28
End: 2023-05-11
Payer: COMMERCIAL

## 2023-05-11 VITALS
RESPIRATION RATE: 18 BRPM | SYSTOLIC BLOOD PRESSURE: 104 MMHG | BODY MASS INDEX: 33.4 KG/M2 | TEMPERATURE: 99.1 F | WEIGHT: 194.6 LBS | OXYGEN SATURATION: 98 % | HEART RATE: 112 BPM | DIASTOLIC BLOOD PRESSURE: 78 MMHG

## 2023-05-11 DIAGNOSIS — J35.8 TONSIL STONE: ICD-10-CM

## 2023-05-11 DIAGNOSIS — R07.0 THROAT PAIN: Primary | ICD-10-CM

## 2023-05-11 DIAGNOSIS — J35.1 ENLARGED TONSILS: ICD-10-CM

## 2023-05-11 DIAGNOSIS — H65.111 ACUTE MUCOID OTITIS MEDIA OF RIGHT EAR: ICD-10-CM

## 2023-05-11 DIAGNOSIS — J35.1 ENLARGED TONSILS: Primary | ICD-10-CM

## 2023-05-11 PROCEDURE — G8427 DOCREV CUR MEDS BY ELIG CLIN: HCPCS | Performed by: NURSE PRACTITIONER

## 2023-05-11 PROCEDURE — 1036F TOBACCO NON-USER: CPT | Performed by: NURSE PRACTITIONER

## 2023-05-11 PROCEDURE — G8417 CALC BMI ABV UP PARAM F/U: HCPCS | Performed by: NURSE PRACTITIONER

## 2023-05-11 PROCEDURE — 99213 OFFICE O/P EST LOW 20 MIN: CPT | Performed by: NURSE PRACTITIONER

## 2023-05-11 PROCEDURE — 87880 STREP A ASSAY W/OPTIC: CPT | Performed by: NURSE PRACTITIONER

## 2023-05-11 RX ORDER — AZITHROMYCIN 250 MG/1
TABLET, FILM COATED ORAL
Qty: 6 TABLET | Refills: 0 | Status: SHIPPED | OUTPATIENT
Start: 2023-05-11

## 2023-05-11 ASSESSMENT — ENCOUNTER SYMPTOMS
ABDOMINAL PAIN: 0
COUGH: 0
RHINORRHEA: 0
CONSTIPATION: 0
VOMITING: 0
SHORTNESS OF BREATH: 0
DIARRHEA: 0
CHEST TIGHTNESS: 0
EYE DISCHARGE: 0
SORE THROAT: 1

## 2023-05-11 ASSESSMENT — PATIENT HEALTH QUESTIONNAIRE - PHQ9
SUM OF ALL RESPONSES TO PHQ QUESTIONS 1-9: 0
SUM OF ALL RESPONSES TO PHQ QUESTIONS 1-9: 0
SUM OF ALL RESPONSES TO PHQ9 QUESTIONS 1 & 2: 0
2. FEELING DOWN, DEPRESSED OR HOPELESS: 0
SUM OF ALL RESPONSES TO PHQ QUESTIONS 1-9: 0
SUM OF ALL RESPONSES TO PHQ QUESTIONS 1-9: 0
1. LITTLE INTEREST OR PLEASURE IN DOING THINGS: 0

## 2023-05-11 NOTE — PROGRESS NOTES
murmur heard. No friction rub. No gallop. Pulmonary:      Effort: Pulmonary effort is normal.      Breath sounds: Normal breath sounds. Abdominal:      General: Bowel sounds are normal.      Palpations: Abdomen is soft. Tenderness: There is no abdominal tenderness. Musculoskeletal:         General: Normal range of motion. Cervical back: Normal range of motion and neck supple. No spinous process tenderness. Skin:     General: Skin is warm and dry. Findings: No erythema or rash. Neurological:      Mental Status: She is alert and oriented to person, place, and time. Psychiatric:         Speech: Speech normal.         Behavior: Behavior normal.         Thought Content:  Thought content normal.         Data Reviewed and Summarized       Labs:     Imaging/Testing:        LATRICIA Patel - CNP

## 2023-05-11 NOTE — TELEPHONE ENCOUNTER
From: Ian Streeter  To: Fidel Owens  Sent: 5/11/2023 1:03 PM EDT  Subject: Referral ent     Gina Cartwright! Jamal Nunez thought about getting my tonsils out and I think I would like to get referred. Can I choose? If so Id like to be referred to [facility]. Thank you.

## 2023-06-07 ENCOUNTER — TELEPHONE (OUTPATIENT)
Dept: FAMILY MEDICINE CLINIC | Age: 28
End: 2023-06-07

## 2023-06-07 NOTE — TELEPHONE ENCOUNTER
Patients insurance company has not given a response in 24 business days for Pristiq 25mg prior authorization. Please advise.

## 2023-06-07 NOTE — TELEPHONE ENCOUNTER
Prior Authorization Not Updated Recently    This prior authorization request has not been updated for 24 business days.      Waiting for Payer Response  PA Detail   The payer has not yet made a decision on the prior authorization request. Case ID: [id number]      Payer:  Sarah Kerr and [address] [place] [address]    [phone]     [phone]    Prior auth initiated by: Clare Ocasio LPN

## 2023-07-06 ENCOUNTER — OFFICE VISIT (OUTPATIENT)
Dept: FAMILY MEDICINE CLINIC | Age: 28
End: 2023-07-06
Payer: COMMERCIAL

## 2023-07-06 VITALS
HEART RATE: 88 BPM | TEMPERATURE: 96.9 F | DIASTOLIC BLOOD PRESSURE: 76 MMHG | WEIGHT: 202 LBS | OXYGEN SATURATION: 98 % | RESPIRATION RATE: 16 BRPM | SYSTOLIC BLOOD PRESSURE: 110 MMHG | BODY MASS INDEX: 34.67 KG/M2

## 2023-07-06 DIAGNOSIS — F41.1 GAD (GENERALIZED ANXIETY DISORDER): Primary | ICD-10-CM

## 2023-07-06 PROCEDURE — 99213 OFFICE O/P EST LOW 20 MIN: CPT | Performed by: NURSE PRACTITIONER

## 2023-07-06 RX ORDER — DESVENLAFAXINE SUCCINATE 50 MG/1
50 TABLET, EXTENDED RELEASE ORAL DAILY
Qty: 90 TABLET | Refills: 0 | Status: SHIPPED | OUTPATIENT
Start: 2023-07-06

## 2023-07-06 ASSESSMENT — ENCOUNTER SYMPTOMS
VOMITING: 0
CONSTIPATION: 0
EYE DISCHARGE: 0
CHEST TIGHTNESS: 0
SHORTNESS OF BREATH: 0
ABDOMINAL PAIN: 0
COUGH: 0
RHINORRHEA: 0
DIARRHEA: 0

## 2023-07-06 NOTE — PROGRESS NOTES
Behavior: Behavior normal.         Thought Content:  Thought content normal.       Data Reviewed and Summarized       Labs:     Imaging/Testing:        LATRICIA Jiang - CNP

## 2023-07-10 ENCOUNTER — PATIENT MESSAGE (OUTPATIENT)
Dept: FAMILY MEDICINE CLINIC | Age: 28
End: 2023-07-10

## 2023-07-10 DIAGNOSIS — N30.90 CYSTITIS: Primary | ICD-10-CM

## 2023-07-10 RX ORDER — NITROFURANTOIN 25; 75 MG/1; MG/1
100 CAPSULE ORAL 2 TIMES DAILY
Qty: 14 CAPSULE | Refills: 0 | Status: SHIPPED | OUTPATIENT
Start: 2023-07-10 | End: 2023-07-17

## 2023-09-01 ENCOUNTER — PATIENT MESSAGE (OUTPATIENT)
Dept: FAMILY MEDICINE CLINIC | Age: 28
End: 2023-09-01

## 2023-09-01 DIAGNOSIS — F41.1 GAD (GENERALIZED ANXIETY DISORDER): Primary | ICD-10-CM

## 2023-09-01 NOTE — TELEPHONE ENCOUNTER
From: Yareli Laguna  To: Lalabhumi Deutsch  Sent: 9/1/2023 6:55 AM EDT  Subject: Masha Swann! I know I have an appointment on September 6th to talk about the 50mg pristiq. I feel like we should cancel it because I never received it. And I have just been taking the 25mg. But I have no refills left. . wondering if I can just stay on the 25mg, and get a refill? Also, starting today September 1st I will only have [facility]. I will no longer have the [employer] associated with my mom and dads insurance!

## 2023-09-05 RX ORDER — DESVENLAFAXINE 25 MG/1
25 TABLET, EXTENDED RELEASE ORAL DAILY
Qty: 30 TABLET | Refills: 3 | Status: SHIPPED | OUTPATIENT
Start: 2023-09-05

## 2023-10-09 ENCOUNTER — PATIENT MESSAGE (OUTPATIENT)
Dept: FAMILY MEDICINE CLINIC | Age: 28
End: 2023-10-09

## 2023-10-09 DIAGNOSIS — R23.3 EASY BRUISING: ICD-10-CM

## 2023-10-09 DIAGNOSIS — Z00.8 ENCOUNTER FOR BIOMETRIC SCREENING: Primary | ICD-10-CM

## 2023-10-11 ENCOUNTER — NURSE ONLY (OUTPATIENT)
Dept: FAMILY MEDICINE CLINIC | Age: 28
End: 2023-10-11
Payer: MEDICAID

## 2023-10-11 ENCOUNTER — HOSPITAL ENCOUNTER (OUTPATIENT)
Age: 28
Setting detail: SPECIMEN
Discharge: HOME | End: 2023-10-11

## 2023-10-11 DIAGNOSIS — R23.3 EASY BRUISING: ICD-10-CM

## 2023-10-11 DIAGNOSIS — Z00.8 ENCOUNTER FOR BIOMETRIC SCREENING: ICD-10-CM

## 2023-10-11 DIAGNOSIS — Z00.8 HEALTH EXAMINATION IN POPULATION SURVEY: Primary | ICD-10-CM

## 2023-10-11 PROCEDURE — 36415 COLL VENOUS BLD VENIPUNCTURE: CPT | Performed by: NURSE PRACTITIONER

## 2023-10-11 NOTE — PROGRESS NOTES
Venipuncture obtained from  right arm. Patient tolerated the procedure without complications or complaints.     1pst 1 lvv

## 2023-10-12 LAB
ALBUMIN SERPL-MCNC: 4.2 G/DL (ref 3.5–5.2)
ALBUMIN/GLOB SERPL: 1 {RATIO} (ref 1–2.5)
ALP SERPL-CCNC: 110 U/L (ref 35–104)
ALT SERPL-CCNC: 14 U/L (ref 10–35)
ANION GAP SERPL CALCULATED.3IONS-SCNC: 9 MMOL/L (ref 9–16)
AST SERPL-CCNC: 24 U/L (ref 10–35)
BASOPHILS # BLD: 0.05 K/UL (ref 0–0.2)
BASOPHILS NFR BLD: 1 % (ref 0–2)
BILIRUB SERPL-MCNC: 0.4 MG/DL (ref 0–1.2)
BUN SERPL-MCNC: 14 MG/DL (ref 6–20)
CALCIUM SERPL-MCNC: 9.6 MG/DL (ref 8.6–10.4)
CHLORIDE SERPL-SCNC: 103 MMOL/L (ref 98–107)
CHOLEST SERPL-MCNC: 168 MG/DL (ref 0–199)
CHOLESTEROL/HDL RATIO: 3
CO2 SERPL-SCNC: 25 MMOL/L (ref 20–31)
CREAT SERPL-MCNC: 0.7 MG/DL (ref 0.5–0.9)
EOSINOPHIL # BLD: 0.58 K/UL (ref 0–0.44)
EOSINOPHILS RELATIVE PERCENT: 6 % (ref 1–4)
ERYTHROCYTE [DISTWIDTH] IN BLOOD BY AUTOMATED COUNT: 12.6 % (ref 11.8–14.4)
GLUCOSE SERPL-MCNC: 95 MG/DL (ref 74–99)
HCT VFR BLD AUTO: 46.5 % (ref 36.3–47.1)
HDLC SERPL-MCNC: 50 MG/DL
HGB BLD-MCNC: 14.9 G/DL (ref 11.9–15.1)
IMM GRANULOCYTES # BLD AUTO: 0.05 K/UL (ref 0–0.3)
IMM GRANULOCYTES NFR BLD: 1 %
LDLC SERPL CALC-MCNC: 93 MG/DL (ref 0–100)
LYMPHOCYTES NFR BLD: 2.59 K/UL (ref 1.1–3.7)
LYMPHOCYTES RELATIVE PERCENT: 26 % (ref 24–43)
MCH RBC QN AUTO: 30 PG (ref 25.2–33.5)
MCHC RBC AUTO-ENTMCNC: 32 G/DL (ref 28.4–34.8)
MCV RBC AUTO: 93.8 FL (ref 82.6–102.9)
MONOCYTES NFR BLD: 0.78 K/UL (ref 0.1–1.2)
MONOCYTES NFR BLD: 8 % (ref 3–12)
NEUTROPHILS NFR BLD: 58 % (ref 36–65)
NEUTS SEG NFR BLD: 5.91 K/UL (ref 1.5–8.1)
NRBC BLD-RTO: 0 PER 100 WBC
PLATELET # BLD AUTO: 330 K/UL (ref 138–453)
PMV BLD AUTO: 10.4 FL (ref 8.1–13.5)
POTASSIUM SERPL-SCNC: 4.4 MMOL/L (ref 3.7–5.3)
PROT SERPL-MCNC: 7.5 G/DL (ref 6.6–8.7)
RBC # BLD AUTO: 4.96 M/UL (ref 3.95–5.11)
SODIUM SERPL-SCNC: 137 MMOL/L (ref 136–145)
TRIGL SERPL-MCNC: 128 MG/DL (ref 0–149)
TSH SERPL DL<=0.05 MIU/L-ACNC: 1.53 UIU/ML (ref 0.27–4.2)
VLDLC SERPL CALC-MCNC: 26 MG/DL
WBC OTHER # BLD: 10 K/UL (ref 3.5–11.3)

## 2024-01-04 ENCOUNTER — OFFICE VISIT (OUTPATIENT)
Dept: FAMILY MEDICINE CLINIC | Age: 29
End: 2024-01-04
Payer: MEDICAID

## 2024-01-04 ENCOUNTER — HOSPITAL ENCOUNTER (OUTPATIENT)
Age: 29
Setting detail: SPECIMEN
Discharge: HOME | End: 2024-01-04

## 2024-01-04 VITALS
DIASTOLIC BLOOD PRESSURE: 72 MMHG | HEART RATE: 76 BPM | TEMPERATURE: 97.5 F | BODY MASS INDEX: 34.26 KG/M2 | SYSTOLIC BLOOD PRESSURE: 134 MMHG | WEIGHT: 199.6 LBS | OXYGEN SATURATION: 99 % | RESPIRATION RATE: 16 BRPM

## 2024-01-04 DIAGNOSIS — R35.0 URINARY FREQUENCY: ICD-10-CM

## 2024-01-04 DIAGNOSIS — N89.8 VAGINAL ODOR: Primary | ICD-10-CM

## 2024-01-04 LAB
BILIRUBIN, POC: ABNORMAL
BLOOD URINE, POC: NEGATIVE
CLARITY, POC: CLEAR
COLOR, POC: YELLOW
CONTROL: NORMAL
GLUCOSE URINE, POC: NEGATIVE
KETONES, POC: NEGATIVE
LEUKOCYTE EST, POC: ABNORMAL
NITRITE, POC: NEGATIVE
PH, POC: 7
PREGNANCY TEST URINE, POC: NORMAL
PROTEIN, POC: NEGATIVE
SPECIFIC GRAVITY, POC: 1.02
UROBILINOGEN, POC: ABNORMAL

## 2024-01-04 PROCEDURE — 99213 OFFICE O/P EST LOW 20 MIN: CPT | Performed by: NURSE PRACTITIONER

## 2024-01-04 PROCEDURE — 81025 URINE PREGNANCY TEST: CPT | Performed by: NURSE PRACTITIONER

## 2024-01-04 PROCEDURE — 81003 URINALYSIS AUTO W/O SCOPE: CPT | Performed by: NURSE PRACTITIONER

## 2024-01-04 ASSESSMENT — ENCOUNTER SYMPTOMS
COUGH: 0
ABDOMINAL PAIN: 0
SHORTNESS OF BREATH: 0
VOMITING: 0
RHINORRHEA: 0
EYE DISCHARGE: 0
CHEST TIGHTNESS: 0
DIARRHEA: 0
CONSTIPATION: 0

## 2024-01-04 ASSESSMENT — PATIENT HEALTH QUESTIONNAIRE - PHQ9
2. FEELING DOWN, DEPRESSED OR HOPELESS: 0
SUM OF ALL RESPONSES TO PHQ QUESTIONS 1-9: 0
1. LITTLE INTEREST OR PLEASURE IN DOING THINGS: 0
SUM OF ALL RESPONSES TO PHQ QUESTIONS 1-9: 0
SUM OF ALL RESPONSES TO PHQ QUESTIONS 1-9: 0
SUM OF ALL RESPONSES TO PHQ9 QUESTIONS 1 & 2: 0
SUM OF ALL RESPONSES TO PHQ QUESTIONS 1-9: 0

## 2024-01-04 NOTE — PROGRESS NOTES
.   [facility], [place]  [address].  [facility] [address]  Dept: [phone]  Dept Fax: [phone]    Visit type: Established patient    Reason for Visit: Urinary Frequency (Also with frequency ), Health Maintenance (Vaccines ), Abdominal Pain (Lower abdominal pain with intercourse and bloating. About x 1 month. ), and Vaginal Discharge (Says odor with discharge )         Assessment and Plan       1. Vaginal odor  -     Vaginal Pathogens DNA Panel; Future  2. Urinary frequency  -     POCT Urinalysis No Micro (Auto)  -     POCT urine pregnancy  -     Culture, Urine    POCT UA did have leukocytes- will send for culture  Will send My chart message regarding pregnancy results and vaginal swab results in which she self swabbed   Increase water intake  Can use midol for menstrual cramping in future   Return if symptoms worsen or fail to improve.       Subjective       Concern for UTI  States that she did have blood with urination a couple of weeks ago, but then a couple days later she started her menstrual cycle   Lower abd pain/ bloating  before her menstrual cycle started- but not currently  Urinary frequency  Vaginal odor and discharge is thicker  No concerned for STI- did use condoms   No itching   No dysuria         Review of Systems   Constitutional:  Negative for activity change, appetite change and fever.   HENT:  Negative for congestion, ear pain and rhinorrhea.    Eyes:  Negative for discharge and visual disturbance.   Respiratory:  Negative for cough, chest tightness and shortness of breath.    Cardiovascular:  Negative for chest pain and palpitations.   Gastrointestinal:  Negative for abdominal pain (in the past not currently), constipation, diarrhea and vomiting.   Genitourinary:  Positive for frequency and vaginal discharge. Negative for decreased urine volume, difficulty urinating, flank pain, hematuria, urgency, vaginal bleeding and vaginal pain.   Musculoskeletal:

## 2024-01-05 DIAGNOSIS — B96.89 BACTERIAL VAGINOSIS: Primary | ICD-10-CM

## 2024-01-05 DIAGNOSIS — N76.0 BACTERIAL VAGINOSIS: Primary | ICD-10-CM

## 2024-01-05 LAB
CANDIDA SPECIES: NEGATIVE
GARDNERELLA VAGINALIS: POSITIVE
SOURCE: ABNORMAL
TRICHOMONAS: NEGATIVE

## 2024-01-05 RX ORDER — METRONIDAZOLE 500 MG/1
500 TABLET ORAL 2 TIMES DAILY
Qty: 14 TABLET | Refills: 0 | Status: SHIPPED | OUTPATIENT
Start: 2024-01-05 | End: 2024-01-12

## 2024-01-06 LAB
MICROORGANISM SPEC CULT: NORMAL
SPECIMEN DESCRIPTION: NORMAL

## 2024-01-17 ENCOUNTER — NURSE ONLY (OUTPATIENT)
Dept: LAB | Age: 29
End: 2024-01-17

## 2024-01-19 ENCOUNTER — PATIENT MESSAGE (OUTPATIENT)
Dept: FAMILY MEDICINE CLINIC | Age: 29
End: 2024-01-19

## 2024-01-19 DIAGNOSIS — N76.0 BACTERIAL VAGINOSIS: Primary | ICD-10-CM

## 2024-01-19 DIAGNOSIS — B96.89 BACTERIAL VAGINOSIS: Primary | ICD-10-CM

## 2024-01-19 RX ORDER — METRONIDAZOLE 500 MG/1
500 TABLET ORAL 2 TIMES DAILY
Qty: 14 TABLET | Refills: 0 | Status: SHIPPED | OUTPATIENT
Start: 2024-01-19 | End: 2024-01-26

## 2024-01-19 NOTE — TELEPHONE ENCOUNTER
From: Beatriz Hill  To: Margo Washington  Sent: 1/19/2024 5:35 AM EST  Subject: Bv medication     I was wondering if I could get a refill of the medication for my infection. I do not think it went away, I also think maybe a shower product or something I’m using is making it come back? The smell/stomach ache is back.

## 2024-02-12 LAB — CYTOLOGY THIN PREP PAP: NORMAL

## 2024-02-14 ENCOUNTER — OFFICE VISIT (OUTPATIENT)
Dept: FAMILY MEDICINE CLINIC | Age: 29
End: 2024-02-14
Payer: MEDICAID

## 2024-02-14 VITALS
TEMPERATURE: 98.1 F | HEART RATE: 102 BPM | SYSTOLIC BLOOD PRESSURE: 136 MMHG | OXYGEN SATURATION: 99 % | BODY MASS INDEX: 34.42 KG/M2 | RESPIRATION RATE: 16 BRPM | DIASTOLIC BLOOD PRESSURE: 88 MMHG | HEIGHT: 64 IN | WEIGHT: 201.6 LBS

## 2024-02-14 DIAGNOSIS — F41.1 GAD (GENERALIZED ANXIETY DISORDER): ICD-10-CM

## 2024-02-14 DIAGNOSIS — I10 ESSENTIAL HYPERTENSION: Primary | ICD-10-CM

## 2024-02-14 PROBLEM — O47.9 FALSE LABOR: Status: RESOLVED | Noted: 2019-08-09 | Resolved: 2024-02-14

## 2024-02-14 PROBLEM — O47.9 UTERINE CONTRACTIONS DURING PREGNANCY: Status: RESOLVED | Noted: 2021-09-29 | Resolved: 2024-02-14

## 2024-02-14 PROCEDURE — 1036F TOBACCO NON-USER: CPT | Performed by: NURSE PRACTITIONER

## 2024-02-14 PROCEDURE — G8484 FLU IMMUNIZE NO ADMIN: HCPCS | Performed by: NURSE PRACTITIONER

## 2024-02-14 PROCEDURE — G8417 CALC BMI ABV UP PARAM F/U: HCPCS | Performed by: NURSE PRACTITIONER

## 2024-02-14 PROCEDURE — 99214 OFFICE O/P EST MOD 30 MIN: CPT | Performed by: NURSE PRACTITIONER

## 2024-02-14 PROCEDURE — G8427 DOCREV CUR MEDS BY ELIG CLIN: HCPCS | Performed by: NURSE PRACTITIONER

## 2024-02-14 PROCEDURE — 3075F SYST BP GE 130 - 139MM HG: CPT | Performed by: NURSE PRACTITIONER

## 2024-02-14 PROCEDURE — 3079F DIAST BP 80-89 MM HG: CPT | Performed by: NURSE PRACTITIONER

## 2024-02-14 RX ORDER — FLUOXETINE 10 MG/1
10 CAPSULE ORAL DAILY
Qty: 30 CAPSULE | Refills: 3 | Status: SHIPPED | OUTPATIENT
Start: 2024-02-14

## 2024-02-14 NOTE — PROGRESS NOTES
tightness and shortness of breath.    Cardiovascular:  Negative for chest pain and palpitations.   Gastrointestinal:  Negative for abdominal pain, constipation, diarrhea and vomiting.   Genitourinary:  Negative for difficulty urinating and hematuria.   Musculoskeletal:  Negative for arthralgias and myalgias.   Skin:  Negative for rash.   Neurological:  Negative for dizziness, weakness, numbness and headaches.   Psychiatric/Behavioral:  Positive for agitation, behavioral problems, confusion and decreased concentration. The patient is nervous/anxious.         Allergies   Allergen Reactions    Keflex [Cephalexin]      Years ago. States someone told her she was allergic to it       Outpatient Medications Prior to Visit   Medication Sig Dispense Refill    Aspirin-Acetaminophen-Caffeine (EXCEDRIN PO) Take by mouth Taking as needed for headaches      desvenlafaxine succinate (PRISTIQ) 25 MG TB24 extended release tablet Take 1 tablet by mouth daily (Patient not taking: Reported on 2/14/2024) 30 tablet 3     No facility-administered medications prior to visit.        Past Medical History:   Diagnosis Date    Anxiety     Depression     Postpartum depression     zoloft- plan to get back on after delivered        Social History     Tobacco Use    Smoking status: Never    Smokeless tobacco: Never   Substance Use Topics    Alcohol use: No        Past Surgical History:   Procedure Laterality Date    WISDOM TOOTH EXTRACTION  2020       No family history on file.    Objective       /88   Pulse (!) 102   Temp 98.1 °F (36.7 °C) (Temporal)   Resp 16   Ht 1.626 m (5' 4\")   Wt 91.4 kg (201 lb 9.6 oz)   LMP 01/19/2024 (Exact Date)   SpO2 99%   BMI 34.60 kg/m²   Physical Exam  Vitals reviewed.   Constitutional:       Appearance: She is well-developed.   HENT:      Head: Normocephalic and atraumatic.      Right Ear: External ear normal.      Left Ear: External ear normal.   Eyes:      Conjunctiva/sclera: Conjunctivae normal.

## 2024-07-03 DIAGNOSIS — F41.1 GAD (GENERALIZED ANXIETY DISORDER): ICD-10-CM

## 2024-07-03 RX ORDER — FLUOXETINE 10 MG/1
10 CAPSULE ORAL DAILY
Qty: 30 CAPSULE | Refills: 3 | Status: SHIPPED | OUTPATIENT
Start: 2024-07-03

## 2024-07-12 ENCOUNTER — PATIENT MESSAGE (OUTPATIENT)
Dept: FAMILY MEDICINE CLINIC | Age: 29
End: 2024-07-12

## 2024-07-12 RX ORDER — NITROFURANTOIN 25; 75 MG/1; MG/1
100 CAPSULE ORAL 2 TIMES DAILY
Qty: 14 CAPSULE | Refills: 0 | Status: SHIPPED | OUTPATIENT
Start: 2024-07-12 | End: 2024-07-19

## 2024-07-12 NOTE — TELEPHONE ENCOUNTER
From: Beatriz Hill  To: Margo Washington  Sent: 7/12/2024 3:19 AM EDT  Subject: Uti    Is there anyway you can prescribe me with a uti medication? I just won’t be able to be seen tomorrow and I’m just pretty uncomfortable at the moment.